# Patient Record
Sex: MALE | Race: WHITE | NOT HISPANIC OR LATINO | Employment: FULL TIME | URBAN - METROPOLITAN AREA
[De-identification: names, ages, dates, MRNs, and addresses within clinical notes are randomized per-mention and may not be internally consistent; named-entity substitution may affect disease eponyms.]

---
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
## 2021-03-18 ENCOUNTER — APPOINTMENT (OUTPATIENT)
Dept: GENERAL RADIOLOGY | Facility: CLINIC | Age: 21
End: 2021-03-18
Attending: FAMILY MEDICINE
Payer: COMMERCIAL

## 2021-03-18 ENCOUNTER — HOSPITAL ENCOUNTER (EMERGENCY)
Facility: CLINIC | Age: 21
Discharge: HOME | End: 2021-03-18
Attending: FAMILY MEDICINE | Admitting: FAMILY MEDICINE
Payer: COMMERCIAL

## 2021-03-18 VITALS
SYSTOLIC BLOOD PRESSURE: 119 MMHG | HEIGHT: 74 IN | BODY MASS INDEX: 20.53 KG/M2 | RESPIRATION RATE: 16 BRPM | WEIGHT: 160 LBS | OXYGEN SATURATION: 97 % | HEART RATE: 58 BPM | TEMPERATURE: 98.3 F | DIASTOLIC BLOOD PRESSURE: 60 MMHG

## 2021-03-18 DIAGNOSIS — R10.13 EPIGASTRIC PAIN: ICD-10-CM

## 2021-03-18 PROCEDURE — 99284 EMERGENCY DEPT VISIT MOD MDM: CPT | Mod: 25 | Performed by: FAMILY MEDICINE

## 2021-03-18 PROCEDURE — 71046 X-RAY EXAM CHEST 2 VIEWS: CPT

## 2021-03-18 PROCEDURE — 93010 ELECTROCARDIOGRAM REPORT: CPT | Performed by: FAMILY MEDICINE

## 2021-03-18 PROCEDURE — 93005 ELECTROCARDIOGRAM TRACING: CPT | Performed by: FAMILY MEDICINE

## 2021-03-18 RX ORDER — MAGNESIUM CARB/ALUMINUM HYDROX 105-160MG
296 TABLET,CHEWABLE ORAL ONCE
Qty: 296 ML | Refills: 0 | Status: SHIPPED | OUTPATIENT
Start: 2021-03-18 | End: 2021-03-18

## 2021-03-18 RX ORDER — POLYETHYLENE GLYCOL 3350 17 G/17G
1 POWDER, FOR SOLUTION ORAL DAILY
Qty: 510 G | Refills: 1 | Status: SHIPPED | OUTPATIENT
Start: 2021-03-18 | End: 2022-03-16

## 2021-03-18 ASSESSMENT — ENCOUNTER SYMPTOMS
DIAPHORESIS: 0
SHORTNESS OF BREATH: 0
DIARRHEA: 0
CONSTIPATION: 0
FREQUENCY: 0
SINUS PRESSURE: 0
HEADACHES: 0
DYSURIA: 0
ABDOMINAL PAIN: 1
COUGH: 0
PALPITATIONS: 1
WHEEZING: 0
SORE THROAT: 0
CHILLS: 0
FEVER: 0
VOMITING: 0
BLOOD IN STOOL: 0
NAUSEA: 0

## 2021-03-18 ASSESSMENT — MIFFLIN-ST. JEOR: SCORE: 1810.51

## 2021-03-18 NOTE — ED PROVIDER NOTES
History     Chief Complaint   Patient presents with     Chest Pain     Patient having on and off chest pain.  Feels like there is a couple extra beats.  Does have stress at home due to father being an alcoholic     HPI  Brenda Rizzo is a 20 year old male who presents with SVT history, palpitations anxiety who was seen for chest pain in 2018 in this facility.  Negative work-up.  Over the last couple of weeks he has felt palpitations at times pain in the epigastric and left upper quadrant region.  At times he feels that his face is flushed with this.  He has had acid reflux-like symptoms in the past.  He also notes recent stressors with his family and his father has alcoholism and he and his girlfriend as well as his sibling and their significant other are all living in the same household.  This is quite stressful.    He denies VTE risk.  Denies recent prolonged travel (>3 hours) by car or plane, history or FHx of venous thromboembolism, recent surgery (last 4 weeks), active cancer history, hypercoagulable state, estrogen or other medications/conditions causing VTE or  new unilateral swelling or pain in the legs or calves.   The pain that he experiences is often in the epigastrium left upper quadrant that can radiate into the chest.  No significant black or tarry stools although he notes a couple weeks ago he saw a stool that was more dark.  No blood in the stool or black tarry stools.  He does use marijuana on a regular basis.  No tobacco.  No alcohol.  Prior use of methamphetamine in the more distant past but nothing recent in the last year plus.  No cocaine use.      Allergies:  No Known Allergies    Problem List:    Patient Active Problem List    Diagnosis Date Noted     Anxiety and depression 11/26/2018     Priority: Medium     Supraventricular tachycardia (H) 06/04/2018     Priority: Medium     Palpitations 06/04/2018     Priority: Medium     Anxiety 06/04/2018     Priority: Medium        Past Medical  "History:    Past Medical History:   Diagnosis Date     Unspecified part of open fracture of clavicle 10/08/2004       Past Surgical History:    Past Surgical History:   Procedure Laterality Date     SURGICAL HISTORY OF -   2002    penoscrotal web repair       Family History:    Family History   Problem Relation Age of Onset     Hypertension Maternal Grandmother      Depression Maternal Grandmother        Social History:  Marital Status:  Single [1]  Social History     Tobacco Use     Smoking status: Former Smoker     Years: 1.00     Types: Vaping Device     Smokeless tobacco: Never Used   Substance Use Topics     Alcohol use: No     Drug use: Yes     Types: Marijuana     Comment: daily        Medications:    magnesium citrate 1.745 GM/30ML solution  polyethylene glycol (MIRALAX) 17 GM/Dose powder  FLUoxetine (PROZAC) 20 MG capsule          Review of Systems   Constitutional: Negative for chills, diaphoresis and fever.   HENT: Negative for ear pain, sinus pressure and sore throat.    Eyes: Negative for visual disturbance.   Respiratory: Negative for cough, shortness of breath and wheezing.    Cardiovascular: Positive for chest pain and palpitations.   Gastrointestinal: Positive for abdominal pain. Negative for blood in stool, constipation, diarrhea, nausea and vomiting.   Genitourinary: Negative for dysuria, frequency and urgency.   Skin: Negative for rash.   Neurological: Negative for headaches.   All other systems reviewed and are negative.      Physical Exam   BP: (!) 135/92  Pulse: 72  Temp: 98.3  F (36.8  C)  Resp: 16  Height: 188 cm (6' 2\")  Weight: 72.6 kg (160 lb)  SpO2: 99 %      Physical Exam  Constitutional:       General: He is in acute distress.      Appearance: He is not diaphoretic.   HENT:      Head: Atraumatic.   Eyes:      Conjunctiva/sclera: Conjunctivae normal.   Neck:      Musculoskeletal: Neck supple.      Thyroid: No thyromegaly.   Cardiovascular:      Rate and Rhythm: Normal rate and regular " rhythm.      Pulses: Normal pulses.      Heart sounds: No murmur.   Pulmonary:      Effort: No respiratory distress.      Breath sounds: No stridor. No wheezing or rhonchi.   Abdominal:      General: Abdomen is flat. Bowel sounds are normal. There is no distension.      Palpations: Abdomen is soft. There is no mass.      Tenderness: There is no abdominal tenderness. There is no guarding.   Musculoskeletal:      Right lower leg: No edema.      Left lower leg: No edema.   Skin:     Coloration: Skin is not pale.      Findings: No rash.   Neurological:      General: No focal deficit present.      Mental Status: He is alert and oriented to person, place, and time.      Cranial Nerves: No cranial nerve deficit.      Sensory: No sensory deficit.      Motor: No weakness.         ED Course        Procedures                  EKG Interpretation:      Interpreted by Garrett Swanson MD  EKG done at 1708 hrs. demonstrates a sinus rhythm 64 bpm normal axis.  No ST change.  T wave flattening throughout  Normal R progression and no Q waves.  Normal intervals.  Normal conduction with the exception of an RSR prime lead V1.  No ectopy.  Impression sinus rhythm 64 bpm T wave flattening throughout the precordium and inferiorly but no significant change from prior EKG no new change in his EKG  compared to the 2018    Critical Care time:  none               Results for orders placed or performed during the hospital encounter of 03/18/21 (from the past 24 hour(s))   Chest XR,  PA & LAT    Narrative    XR CHEST TWO VIEWS   3/18/2021 6:07 PM     HISTORY: Chest pain    COMPARISON: 4/26/2018.      Impression    IMPRESSION: No acute cardiopulmonary disease.       Medications - No data to display    Assessments & Plan (with Medical Decision Making)     MDM: Brenda Rizzo is a 20 year old male who presents with epigastric pain left upper quadrant abdominal pain that radiates into the chest.  This is associated with occasional palpitations and he  also has a history of gastroesophageal reflux, anxiety, multiple stressors at home.  No obvious exertional components or red flags.  No VTE risk factors.  Pain appears to be primarily in the abdomen.  His examination is benign.  Chest x-ray and EKG are normal.  We discussed management as below related to epigastric and left upper quadrant pain with precautions for return and following up with his primary provider after performing measures below with plan follow-up in about 1 to 2 weeks.  Back to the emergency department for significant worsening and  precautions as below  I have reviewed the nursing notes.    I have reviewed the findings, diagnosis, plan and need for follow up with the patient.       New Prescriptions    MAGNESIUM CITRATE 1.745 GM/30ML SOLUTION    Take 296 mLs by mouth once for 1 dose    POLYETHYLENE GLYCOL (MIRALAX) 17 GM/DOSE POWDER    Take 17 g (1 capful) by mouth daily       Final diagnoses:   Epigastric pain - Unclear cause.  possible causes include peptic ulcer/gastritis/GERD. start priliosec 20 mg orally daily for 2-3 weeks as trial. avoid caffeine, smoking, ibuprofen.  may use either maalox or zantac/pepcid as needed.  consider also  constipation as gas/stool under diaphragm in the LUQ can cause pain.  considder bowel  regimen.  mag citrate 300 ml one bottl and stay close to bathroom for 6 hours and if some reduction in pain, then use miralax 1 capoful daily for 1 week.stay hydrated with 64 oz fluid per day and consider fiber supplement.   follow-up clinic for persistent symptoms.  manage stressors.       3/18/2021   [facility] EMERGENCY DEPT     Garrett Swanson MD  03/18/21 [id number]

## 2021-03-18 NOTE — DISCHARGE INSTRUCTIONS
ICD-10-CM    1. Epigastric pain  R10.13     Unclear cause.  possible causes include peptic ulcer/gastritis/GERD. start priliosec 20 mg orally daily for 2-3 weeks as trial. avoid caffeine, smoking, ibuprofen.  may use either maalox or zantac/pepcid as needed.  consider also  constipation as gas/stool under diaphragm in the LUQ can cause pain.  considder bowel  regimen.  mag citrate 300 ml one bottl and stay close to bathroom for 6 hours and if some reduction in pain, then use miralax 1 capoful daily for 1 week.stay hydrated with 64 oz fluid per day and consider fiber supplement.   follow-up clinic for persistent symptoms.  manage stressors.

## 2021-04-04 ENCOUNTER — HEALTH MAINTENANCE LETTER (OUTPATIENT)
Age: 21
End: 2021-04-04

## 2021-09-15 ENCOUNTER — MYC REFILL (OUTPATIENT)
Dept: FAMILY MEDICINE | Facility: CLINIC | Age: 21
End: 2021-09-15

## 2021-09-15 ENCOUNTER — APPOINTMENT (OUTPATIENT)
Dept: ULTRASOUND IMAGING | Facility: CLINIC | Age: 21
End: 2021-09-15
Attending: FAMILY MEDICINE
Payer: COMMERCIAL

## 2021-09-15 ENCOUNTER — HOSPITAL ENCOUNTER (EMERGENCY)
Facility: CLINIC | Age: 21
Discharge: HOME | End: 2021-09-15
Attending: FAMILY MEDICINE | Admitting: FAMILY MEDICINE
Payer: COMMERCIAL

## 2021-09-15 VITALS
BODY MASS INDEX: 19.9 KG/M2 | WEIGHT: 155 LBS | TEMPERATURE: 97.2 F | HEART RATE: 59 BPM | OXYGEN SATURATION: 97 % | SYSTOLIC BLOOD PRESSURE: 129 MMHG | DIASTOLIC BLOOD PRESSURE: 59 MMHG | RESPIRATION RATE: 16 BRPM

## 2021-09-15 DIAGNOSIS — F41.9 ANXIETY AND DEPRESSION: ICD-10-CM

## 2021-09-15 DIAGNOSIS — J02.9 ACUTE PHARYNGITIS, UNSPECIFIED ETIOLOGY: ICD-10-CM

## 2021-09-15 DIAGNOSIS — F32.A ANXIETY AND DEPRESSION: ICD-10-CM

## 2021-09-15 DIAGNOSIS — R10.13 EPIGASTRIC PAIN: ICD-10-CM

## 2021-09-15 DIAGNOSIS — R05.9 COUGH: ICD-10-CM

## 2021-09-15 LAB
ALBUMIN SERPL-MCNC: 4.4 G/DL (ref 3.4–5)
ALP SERPL-CCNC: 88 U/L (ref 40–150)
ALT SERPL W P-5'-P-CCNC: 23 U/L (ref 0–70)
ANION GAP SERPL CALCULATED.3IONS-SCNC: 4 MMOL/L (ref 3–14)
AST SERPL W P-5'-P-CCNC: 13 U/L (ref 0–45)
BASOPHILS # BLD AUTO: 0 10E3/UL (ref 0–0.2)
BASOPHILS NFR BLD AUTO: 1 %
BILIRUB SERPL-MCNC: 0.4 MG/DL (ref 0.2–1.3)
BUN SERPL-MCNC: 7 MG/DL (ref 7–30)
CALCIUM SERPL-MCNC: 9 MG/DL (ref 8.5–10.1)
CHLORIDE BLD-SCNC: 110 MMOL/L (ref 94–109)
CO2 SERPL-SCNC: 27 MMOL/L (ref 20–32)
CREAT SERPL-MCNC: 0.89 MG/DL (ref 0.66–1.25)
DEPRECATED S PYO AG THROAT QL EIA: NEGATIVE
EOSINOPHIL # BLD AUTO: 0.1 10E3/UL (ref 0–0.7)
EOSINOPHIL NFR BLD AUTO: 1 %
ERYTHROCYTE [DISTWIDTH] IN BLOOD BY AUTOMATED COUNT: 12.1 % (ref 10–15)
GFR SERPL CREATININE-BSD FRML MDRD: >90 ML/MIN/1.73M2
GLUCOSE BLD-MCNC: 90 MG/DL (ref 70–99)
HCT VFR BLD AUTO: 44.7 % (ref 40–53)
HGB BLD-MCNC: 15.2 G/DL (ref 13.3–17.7)
IMM GRANULOCYTES # BLD: 0 10E3/UL
IMM GRANULOCYTES NFR BLD: 0 %
LIPASE SERPL-CCNC: 59 U/L (ref 73–393)
LYMPHOCYTES # BLD AUTO: 2.7 10E3/UL (ref 0.8–5.3)
LYMPHOCYTES NFR BLD AUTO: 47 %
MCH RBC QN AUTO: 29.2 PG (ref 26.5–33)
MCHC RBC AUTO-ENTMCNC: 34 G/DL (ref 31.5–36.5)
MCV RBC AUTO: 86 FL (ref 78–100)
MONOCYTES # BLD AUTO: 0.3 10E3/UL (ref 0–1.3)
MONOCYTES NFR BLD AUTO: 6 %
NEUTROPHILS # BLD AUTO: 2.6 10E3/UL (ref 1.6–8.3)
NEUTROPHILS NFR BLD AUTO: 45 %
NRBC # BLD AUTO: 0 10E3/UL
NRBC BLD AUTO-RTO: 0 /100
PLATELET # BLD AUTO: 248 10E3/UL (ref 150–450)
POTASSIUM BLD-SCNC: 4 MMOL/L (ref 3.4–5.3)
PROT SERPL-MCNC: 7.6 G/DL (ref 6.8–8.8)
RBC # BLD AUTO: 5.21 10E6/UL (ref 4.4–5.9)
SARS-COV-2 RNA RESP QL NAA+PROBE: NEGATIVE
SODIUM SERPL-SCNC: 141 MMOL/L (ref 133–144)
WBC # BLD AUTO: 5.7 10E3/UL (ref 4–11)

## 2021-09-15 PROCEDURE — 258N000003 HC RX IP 258 OP 636: Performed by: FAMILY MEDICINE

## 2021-09-15 PROCEDURE — 99284 EMERGENCY DEPT VISIT MOD MDM: CPT | Mod: 25 | Performed by: FAMILY MEDICINE

## 2021-09-15 PROCEDURE — 85025 COMPLETE CBC W/AUTO DIFF WBC: CPT | Performed by: FAMILY MEDICINE

## 2021-09-15 PROCEDURE — 76705 ECHO EXAM OF ABDOMEN: CPT

## 2021-09-15 PROCEDURE — 82040 ASSAY OF SERUM ALBUMIN: CPT | Performed by: FAMILY MEDICINE

## 2021-09-15 PROCEDURE — 87651 STREP A DNA AMP PROBE: CPT | Performed by: FAMILY MEDICINE

## 2021-09-15 PROCEDURE — 99284 EMERGENCY DEPT VISIT MOD MDM: CPT | Performed by: FAMILY MEDICINE

## 2021-09-15 PROCEDURE — C9803 HOPD COVID-19 SPEC COLLECT: HCPCS | Performed by: FAMILY MEDICINE

## 2021-09-15 PROCEDURE — U0005 INFEC AGEN DETEC AMPLI PROBE: HCPCS | Performed by: FAMILY MEDICINE

## 2021-09-15 PROCEDURE — 36415 COLL VENOUS BLD VENIPUNCTURE: CPT | Performed by: FAMILY MEDICINE

## 2021-09-15 PROCEDURE — C9113 INJ PANTOPRAZOLE SODIUM, VIA: HCPCS | Performed by: FAMILY MEDICINE

## 2021-09-15 PROCEDURE — 83690 ASSAY OF LIPASE: CPT | Performed by: FAMILY MEDICINE

## 2021-09-15 PROCEDURE — 250N000011 HC RX IP 250 OP 636: Performed by: FAMILY MEDICINE

## 2021-09-15 PROCEDURE — 96374 THER/PROPH/DIAG INJ IV PUSH: CPT | Performed by: FAMILY MEDICINE

## 2021-09-15 PROCEDURE — 96375 TX/PRO/DX INJ NEW DRUG ADDON: CPT | Performed by: FAMILY MEDICINE

## 2021-09-15 RX ORDER — ONDANSETRON 2 MG/ML
4 INJECTION INTRAMUSCULAR; INTRAVENOUS ONCE
Status: COMPLETED | OUTPATIENT
Start: 2021-09-15 | End: 2021-09-15

## 2021-09-15 RX ADMIN — SODIUM CHLORIDE 1000 ML: 9 INJECTION, SOLUTION INTRAVENOUS at 15:27

## 2021-09-15 RX ADMIN — PANTOPRAZOLE SODIUM 40 MG: 40 INJECTION, POWDER, FOR SOLUTION INTRAVENOUS at 15:28

## 2021-09-15 RX ADMIN — ONDANSETRON 4 MG: 2 INJECTION INTRAMUSCULAR; INTRAVENOUS at 14:50

## 2021-09-15 ASSESSMENT — ENCOUNTER SYMPTOMS
DYSURIA: 0
PALPITATIONS: 0
DIARRHEA: 0
COUGH: 0
HEADACHES: 0
CONSTIPATION: 0
BLOOD IN STOOL: 0
ABDOMINAL PAIN: 0
SINUS PRESSURE: 0
DIAPHORESIS: 0
SHORTNESS OF BREATH: 0
NAUSEA: 0
VOMITING: 0
FREQUENCY: 0
WHEEZING: 0
CHILLS: 0
FEVER: 0
SORE THROAT: 1

## 2021-09-15 NOTE — ED PROVIDER NOTES
History     Chief Complaint   Patient presents with     Abdominal Pain     HPI  Brenda Rizzo is a 21 year old male who epigastric pain onset at 1 PM today.  Prior pain in this region in the past.  Worse with standing.  Nauseous with a single episode of emesis.  Last bowel movement was today and was normal.  No blood in the urine stool.  No hematemesis.  No known history of peptic ulcer disease.  He uses no tobacco alcohol.  Uses regularly marijuana daily.  He has no abdominal surgery history.  Denies urinary tract symptoms.  No dysuria urgency frequency hematuria.    History of prior supraventricular tachycardia palpitations anxiety.        Allergies:  No Known Allergies    Problem List:    Patient Active Problem List    Diagnosis Date Noted     Anxiety and depression 11/26/2018     Priority: Medium     Supraventricular tachycardia (H) 06/04/2018     Priority: Medium     Palpitations 06/04/2018     Priority: Medium     Anxiety 06/04/2018     Priority: Medium        Past Medical History:    Past Medical History:   Diagnosis Date     Unspecified part of open fracture of clavicle 10/08/2004       Past Surgical History:    Past Surgical History:   Procedure Laterality Date     SURGICAL HISTORY OF -   2002    penoscrotal web repair       Family History:    Family History   Problem Relation Age of Onset     Hypertension Maternal Grandmother      Depression Maternal Grandmother        Social History:  Marital Status:  Single [1]  Social History     Tobacco Use     Smoking status: Former Smoker     Years: 1.00     Types: Vaping Device     Smokeless tobacco: Never Used   Substance Use Topics     Alcohol use: No     Drug use: Yes     Types: Marijuana     Comment: daily        Medications:    FLUoxetine (PROZAC) 20 MG capsule  polyethylene glycol (MIRALAX) 17 GM/Dose powder          Review of Systems   Constitutional: Negative for chills, diaphoresis and fever.   HENT: Positive for congestion and sore throat. Negative for  ear pain and sinus pressure.    Eyes: Negative for visual disturbance.   Respiratory: Negative for cough, shortness of breath and wheezing.    Cardiovascular: Negative for chest pain and palpitations.   Gastrointestinal: Negative for abdominal pain, blood in stool, constipation, diarrhea, nausea and vomiting.   Genitourinary: Negative for dysuria, frequency and urgency.   Skin: Negative for rash.   Neurological: Negative for headaches.   All other systems reviewed and are negative.      Physical Exam   BP: 129/59  Pulse: 59  Temp: 97.2  F (36.2  C)  Resp: 16  Weight: 70.3 kg (155 lb)  SpO2: 97 %      Physical Exam  Constitutional:       General: He is in acute distress.      Appearance: He is not diaphoretic.   HENT:      Head: Atraumatic.   Eyes:      Conjunctiva/sclera: Conjunctivae normal.   Cardiovascular:      Rate and Rhythm: Normal rate and regular rhythm.      Heart sounds: No murmur heard.     Pulmonary:      Effort: Pulmonary effort is normal. No respiratory distress.      Breath sounds: No stridor. No wheezing or rhonchi.   Abdominal:      General: There is no distension.      Palpations: There is no mass.      Tenderness: There is no abdominal tenderness. There is no guarding.   Musculoskeletal:      Cervical back: Neck supple.      Right lower leg: No edema.      Left lower leg: No edema.   Skin:     Coloration: Skin is not pale.      Findings: No rash.   Neurological:      General: No focal deficit present.      Mental Status: He is alert.      Motor: No weakness.       Patient had a vasovagal episode after being drawn blood.  He was sweaty with this and felt nauseous.  This cleared.      ED Course        Procedures              Critical Care time:  none               Results for orders placed or performed during the hospital encounter of 09/15/21 (from the past 24 hour(s))   CBC with Platelets & Differential    Narrative    The following orders were created for panel order CBC with Platelets &  Differential.  Procedure                               Abnormality         Status                     ---------                               -----------         ------                     CBC with platelets and d...[[id number]]                      Final result                 Please view results for these tests on the individual orders.   Comprehensive metabolic panel   Result Value Ref Range    Sodium 141 133 - 144 mmol/L    Potassium 4.0 3.4 - 5.3 mmol/L    Chloride 110 (H) 94 - 109 mmol/L    Carbon Dioxide (CO2) 27 20 - 32 mmol/L    Anion Gap 4 3 - 14 mmol/L    Urea Nitrogen 7 7 - 30 mg/dL    Creatinine 0.89 0.66 - 1.25 mg/dL    Calcium 9.0 8.5 - 10.1 mg/dL    Glucose 90 70 - 99 mg/dL    Alkaline Phosphatase 88 40 - 150 U/L    AST 13 0 - 45 U/L    ALT 23 0 - 70 U/L    Protein Total 7.6 6.8 - 8.8 g/dL    Albumin 4.4 3.4 - 5.0 g/dL    Bilirubin Total 0.4 0.2 - 1.3 mg/dL    GFR Estimate >90 >60 mL/min/1.73m2   CBC with platelets and differential   Result Value Ref Range    WBC Count 5.7 4.0 - 11.0 10e3/uL    RBC Count 5.21 4.40 - 5.90 10e6/uL    Hemoglobin 15.2 13.3 - 17.7 g/dL    Hematocrit 44.7 40.0 - 53.0 %    MCV 86 78 - 100 fL    MCH 29.2 26.5 - 33.0 pg    MCHC 34.0 31.5 - 36.5 g/dL    RDW 12.1 10.0 - 15.0 %    Platelet Count 248 150 - 450 10e3/uL    % Neutrophils 45 %    % Lymphocytes 47 %    % Monocytes 6 %    % Eosinophils 1 %    % Basophils 1 %    % Immature Granulocytes 0 %    NRBCs per 100 WBC 0 <1 /100    Absolute Neutrophils 2.6 1.6 - 8.3 10e3/uL    Absolute Lymphocytes 2.7 0.8 - 5.3 10e3/uL    Absolute Monocytes 0.3 0.0 - 1.3 10e3/uL    Absolute Eosinophils 0.1 0.0 - 0.7 10e3/uL    Absolute Basophils 0.0 0.0 - 0.2 10e3/uL    Absolute Immature Granulocytes 0.0 <=0.0 10e3/uL    Absolute NRBCs 0.0 10e3/uL   Lipase   Result Value Ref Range    Lipase 59 (L) 73 - 393 U/L   Symptomatic COVID-19 Virus (Coronavirus) by PCR Nasopharyngeal    Specimen: Nasopharyngeal; Swab   Result Value Ref Range    SARS CoV2  PCR Negative Negative    Narrative    Testing was performed using the Xpert Xpress SARS-CoV-2 Assay on the  [employer] Instrument Systems. Additional information about  this Emergency Use Authorization (EUA) assay can be found via the Lab  Guide. This test should be ordered for the detection of SARS-CoV-2 in  individuals who meet SARS-CoV-2 clinical and/or epidemiological  criteria. Test performance is unknown in asymptomatic patients. This  test is for in vitro diagnostic use under the FDA EUA for  laboratories certified under CLIA to perform high complexity testing.  This test has not been FDA cleared or approved. A negative result  does not rule out the presence of PCR inhibitors in the specimen or  target RNA in concentration below the limit of detection for the  assay. The possibility of a false negative should be considered if  the patient's recent exposure or clinical presentation suggests  COVID-19. This test was validated by the [facility]. This laboratory is certified under  the Clinical Laboratory Improvement Amendments of 1988 (CLIA-88) as  qualified to perform high complexity laboratory testing.     Streptococcus A Rapid Scr w Reflx to PCR    Specimen: Throat; Swab   Result Value Ref Range    Group A Strep antigen Negative Negative   Abdomen US, limited ([facility] only)    Narrative    US ABDOMEN LIMITED 9/15/2021 3:55 PM    CLINICAL HISTORY: Question biliary colic - sudden onset severe right  upper quadrant epigastric pain, vomiting - resolved. Occurred with  fatty food intake.    TECHNIQUE: Limited abdominal ultrasound.    COMPARISON: None.    FINDINGS:    GALLBLADDER: The gallbladder is normal. No gallstones, wall  thickening, or pericholecystic fluid. Negative sonographic Orozco's  sign.    BILE DUCTS: There is no biliary dilatation. The common duct measures  mm.    LIVER: The liver is normal  in echogenicity without focal mass.     RIGHT KIDNEY:  Unremarkable.    PANCREAS: The visualized portions of the pancreas are normal.    No ascites.      Impression    IMPRESSION:  1.  Normal limited abdominal ultrasound. No gallstones or bile duct  dilatation.    ROSA BONILLA MD         SYSTEM ID:  [id number]       Medications - No data to display    Assessments & Plan (with Medical Decision Making)     MDM: Brenda Rizzo is a 21 year old male who presents with history of frequent marijuana use.  Previously healthy.  He arrives with onset at 1:00 of epigastric pain after he ate cereal with almond milk.  This could have triggered a biliary colic episode.  His pain is now improved.  There was no hematemesis.  No history of peptic ulcer disease.  He vapes his marijuana.  He also had recent congestion, pharyngitis.  He is not immunized against Covid.    Findings are reassuring.    Discussed findings.  Symptomatic management.  Additional recommendations as below.  Precautions given for return.    I have reviewed the nursing notes.    I have reviewed the findings, diagnosis, plan and need for follow up with the patient.       New Prescriptions    No medications on file       Final diagnoses:   Cough - await covid test. quarantine.  if negative consider continue quarantine for another 1-2 days and retest - if still negative, then cleared   Acute pharyngitis, unspecified etiology - await strep test   Epigastric pain - no serious findings.  possible causes include gastritis and mj related hyperemesis. back off MJ to less than every other day. Use prilsoec 20 mg orally daily for 2 weeks./ avoid caffeine, alcohol, tobacco.  return for blood in stool, fever, stay hydrated with 64 oz fluid per day       9/15/2021   [facility] EMERGENCY DEPT     Garrett Swanson MD  09/16/21 0046

## 2021-09-15 NOTE — ED NOTES
Pt presents to the ED with complaints of Epigastric abdominal pain since about 1 pm. Pt has had pain like this before. Pain is worse when standing straight up. Pt has been nauseated and has vomited once. Last BM today, normal (for him). denies blood in urine or stool or vomit.

## 2021-09-15 NOTE — DISCHARGE INSTRUCTIONS
ICD-10-CM    1. Cough  R05     await covid test. quarantine.  if negative consider continue quarantine for another 1-2 days and retest - if still negative, then cleared   2. Acute pharyngitis, unspecified etiology  J02.9     await strep test   3. Epigastric pain  R10.13     no serious findings.  possible causes include gastritis and mj related hyperemesis. back off MJ to less than every other day. Use prilsoec 20 mg orally daily for 2 weeks./ avoid caffeine, alcohol, tobacco.  return for blood in stool, fever, stay hydrated with 64 oz fluid per day

## 2021-09-15 NOTE — ED TRIAGE NOTES
Sudden onset upper abdominal pain and nausea after eating cereal  Felt really hot with a headache  Pain worsened when he tried to defecate  No diarrhea  Vomited once

## 2021-09-16 LAB — GROUP A STREP BY PCR: NOT DETECTED

## 2021-09-18 ENCOUNTER — HEALTH MAINTENANCE LETTER (OUTPATIENT)
Age: 21
End: 2021-09-18

## 2021-10-05 ENCOUNTER — HOSPITAL ENCOUNTER (EMERGENCY)
Facility: CLINIC | Age: 21
Discharge: HOME | End: 2021-10-05
Attending: PHYSICIAN ASSISTANT | Admitting: PHYSICIAN ASSISTANT
Payer: OTHER MISCELLANEOUS

## 2021-10-05 VITALS
RESPIRATION RATE: 16 BRPM | WEIGHT: 155 LBS | DIASTOLIC BLOOD PRESSURE: 69 MMHG | HEIGHT: 74 IN | OXYGEN SATURATION: 99 % | BODY MASS INDEX: 19.89 KG/M2 | HEART RATE: 74 BPM | SYSTOLIC BLOOD PRESSURE: 125 MMHG | TEMPERATURE: 98.3 F

## 2021-10-05 DIAGNOSIS — S61.412A LACERATION OF LEFT HAND WITHOUT FOREIGN BODY, INITIAL ENCOUNTER: ICD-10-CM

## 2021-10-05 PROCEDURE — 250N000011 HC RX IP 250 OP 636: Performed by: PHYSICIAN ASSISTANT

## 2021-10-05 PROCEDURE — 12001 RPR S/N/AX/GEN/TRNK 2.5CM/<: CPT | Performed by: PHYSICIAN ASSISTANT

## 2021-10-05 PROCEDURE — 99283 EMERGENCY DEPT VISIT LOW MDM: CPT | Mod: 25 | Performed by: PHYSICIAN ASSISTANT

## 2021-10-05 PROCEDURE — 90715 TDAP VACCINE 7 YRS/> IM: CPT | Performed by: PHYSICIAN ASSISTANT

## 2021-10-05 PROCEDURE — 90471 IMMUNIZATION ADMIN: CPT | Performed by: PHYSICIAN ASSISTANT

## 2021-10-05 RX ADMIN — CLOSTRIDIUM TETANI TOXOID ANTIGEN (FORMALDEHYDE INACTIVATED), CORYNEBACTERIUM DIPHTHERIAE TOXOID ANTIGEN (FORMALDEHYDE INACTIVATED), BORDETELLA PERTUSSIS TOXOID ANTIGEN (GLUTARALDEHYDE INACTIVATED), BORDETELLA PERTUSSIS FILAMENTOUS HEMAGGLUTININ ANTIGEN (FORMALDEHYDE INACTIVATED), BORDETELLA PERTUSSIS PERTACTIN ANTIGEN, AND BORDETELLA PERTUSSIS FIMBRIAE 2/3 ANTIGEN 0.5 ML: 5; 2; 2.5; 5; 3; 5 INJECTION, SUSPENSION INTRAMUSCULAR at 16:47

## 2021-10-05 ASSESSMENT — ENCOUNTER SYMPTOMS
RESPIRATORY NEGATIVE: 1
WOUND: 1
MUSCULOSKELETAL NEGATIVE: 1
CARDIOVASCULAR NEGATIVE: 1
CONSTITUTIONAL NEGATIVE: 1

## 2021-10-05 ASSESSMENT — MIFFLIN-ST. JEOR: SCORE: 1782.83

## 2021-10-05 NOTE — ED TRIAGE NOTES
Left hand lac. Happened at 1310 while working at [employer]. States from metal track to one of the garage doors. Bleeding controlled, dressing in place from PTA. Unknown Td status.

## 2021-10-05 NOTE — ED PROVIDER NOTES
History     Chief Complaint   Patient presents with     Laceration     HPI  Brenda Rizzo is a 21 year old male with a past medical history of supraventricular tachycardia, palpitations, anxiety and depression who presents with a laceration on his left hand which occurred this afternoon.  The injury was caused by catching his hand in the metal track of a garage door while the garage door was closing. The patient works for [employer] in the [employer] yard.  Denies having any pain with range of motion, states that his finger was not crushed.  His hand was caught by the garage door while closing.  The patient currently rates the pain as 7/10 and describes the pain as sharp. The pain is localized to the proximal and dorsal aspect of the ring finger and does not radiate. The patient has not been taking any over-the-counter medications for relief of pain.  He has cleaned the wound with copious amounts of fluid and bandaged it prior to coming to clinic.  Has normal sensation and normal strength in the ring finger, left hand. No previous surgeries or injuries to the left hand.  Last Tdap vaccine was in 2013.     Allergies:  No Known Allergies    Problem List:    Patient Active Problem List    Diagnosis Date Noted     Anxiety and depression 11/26/2018     Priority: Medium     Supraventricular tachycardia (H) 06/04/2018     Priority: Medium     Palpitations 06/04/2018     Priority: Medium     Anxiety 06/04/2018     Priority: Medium        Past Medical History:    Past Medical History:   Diagnosis Date     Unspecified part of open fracture of clavicle 10/08/2004       Past Surgical History:    Past Surgical History:   Procedure Laterality Date     SURGICAL HISTORY OF -   2002    penoscrotal web repair       Family History:    Family History   Problem Relation Age of Onset     Hypertension Maternal Grandmother      Depression Maternal Grandmother        Social History:  Marital Status:  Single [1]  Social History     Tobacco Use  "    Smoking status: Former Smoker     Years: 1.00     Types: Vaping Device     Smokeless tobacco: Never Used   Substance Use Topics     Alcohol use: No     Drug use: Yes     Types: Marijuana     Comment: daily        Medications:    FLUoxetine (PROZAC) 20 MG capsule  polyethylene glycol (MIRALAX) 17 GM/Dose powder          Review of Systems   Constitutional: Negative.    HENT: Negative.    Respiratory: Negative.    Cardiovascular: Negative.    Musculoskeletal: Negative.    Skin: Positive for wound.       Physical Exam   BP: 125/69  Pulse: 74  Temp: 98.3  F (36.8  C)  Resp: 16  Height: 188 cm (6' 2\")  Weight: 70.3 kg (155 lb)  SpO2: 99 %      Physical Exam  Constitutional:       General: He is not in acute distress.     Appearance: Normal appearance. He is not ill-appearing, toxic-appearing or diaphoretic.   HENT:      Head: Normocephalic and atraumatic.   Cardiovascular:      Pulses: Normal pulses.           Radial pulses are 2+ on the left side.   Skin:     General: Skin is warm and dry.      Findings: Laceration present. No rash.             Comments: Laceration at the dorsal and proximal aspect of the fourth digit, left hand.  Laceration is 1 cm in diameter, 3 mm in depth.  No bony tenderness.  Normal strength and sensation in the affected digit.   Neurological:      General: No focal deficit present.      Mental Status: He is alert and oriented to person, place, and time.      Sensory: No sensory deficit.      Motor: No weakness.      Coordination: Coordination normal.      Gait: Gait normal.   Psychiatric:         Mood and Affect: Mood normal.         Behavior: Behavior normal.         Thought Content: Thought content normal.         Judgment: Judgment normal.         ED Course        Procedures                No results found for this or any previous visit (from the past 24 hour(s)).    Medications   Tdap (tetanus-diphtheria-acell pertussis) (ADACEL) injection 0.5 mL (0.5 mLs Intramuscular Given 10/5/21 1841) "       Assessments & Plan (with Medical Decision Making)   The patient is a 21 year old male with a past medical history of supraventricular tachycardia, palpitations, anxiety and depression who presents with a laceration on his left hand which occurred this afternoon.  The injury was caused by catching his hand in the metal track of a garage door while the garage door was closing.  No crush injury today    Updated the patient's Tdap vaccine today.    The patient had no bony tenderness on exam, had full range of motion without any pain whatsoever.  No reports of objects splintering or coming apart when the injury occurred.  I have no concerns for foreign body today, no concerns for acute fractures today.  That said, x-rays are not necessary today.    Laceration repair was performed as above.  Apply ice for 15-minute intervals over the next 48 hours and use over-the-counter Tylenol/ibuprofen for pain control as needed.  Recommended keeping the wound covered for the next 24-48 hours.  Then allow exposure to air at nighttime, change dressings and apply bacitracin twice daily for the next 7 days. Recommend keeping the wound covered while while there is potential for contamination.  Recommend returning to clinic in 7 to 10 days for suture removal and wound recheck.    Recommend urgent medical evaluation if the patient develops worsening pain, pain out of proportion to injury, numbness or tingling or loss of feeling, or redness/tenderness/swelling in the ring finger of the left hand or in the left hand.    I have reviewed the nursing notes.    I have reviewed the findings, diagnosis, plan and need for follow up with the patient.      New Prescriptions    No medications on file       Final diagnoses:   Laceration of left hand without foreign body, initial encounter       10/5/2021   [facility] EMERGENCY DEPT     Linden Zurita PA-C  10/05/21 4880

## 2021-10-11 ENCOUNTER — OFFICE VISIT (OUTPATIENT)
Dept: FAMILY MEDICINE | Facility: CLINIC | Age: 21
End: 2021-10-11
Payer: COMMERCIAL

## 2021-10-11 VITALS
SYSTOLIC BLOOD PRESSURE: 120 MMHG | RESPIRATION RATE: 14 BRPM | DIASTOLIC BLOOD PRESSURE: 80 MMHG | WEIGHT: 156 LBS | TEMPERATURE: 97.6 F | BODY MASS INDEX: 20.02 KG/M2 | HEIGHT: 74 IN | OXYGEN SATURATION: 98 % | HEART RATE: 72 BPM

## 2021-10-11 DIAGNOSIS — F32.A ANXIETY AND DEPRESSION: Primary | ICD-10-CM

## 2021-10-11 DIAGNOSIS — F41.9 ANXIETY AND DEPRESSION: Primary | ICD-10-CM

## 2021-10-11 DIAGNOSIS — Z48.02 VISIT FOR SUTURE REMOVAL: ICD-10-CM

## 2021-10-11 DIAGNOSIS — S61.412D LACERATION OF LEFT HAND WITHOUT FOREIGN BODY, SUBSEQUENT ENCOUNTER: ICD-10-CM

## 2021-10-11 PROCEDURE — 99214 OFFICE O/P EST MOD 30 MIN: CPT | Performed by: FAMILY MEDICINE

## 2021-10-11 RX ORDER — ESCITALOPRAM OXALATE 5 MG/1
5 TABLET ORAL DAILY
Qty: 30 TABLET | Refills: 0 | Status: SHIPPED | OUTPATIENT
Start: 2021-10-11 | End: 2021-11-05

## 2021-10-11 ASSESSMENT — ANXIETY QUESTIONNAIRES
5. BEING SO RESTLESS THAT IT IS HARD TO SIT STILL: SEVERAL DAYS
7. FEELING AFRAID AS IF SOMETHING AWFUL MIGHT HAPPEN: NEARLY EVERY DAY
6. BECOMING EASILY ANNOYED OR IRRITABLE: NEARLY EVERY DAY
IF YOU CHECKED OFF ANY PROBLEMS ON THIS QUESTIONNAIRE, HOW DIFFICULT HAVE THESE PROBLEMS MADE IT FOR YOU TO DO YOUR WORK, TAKE CARE OF THINGS AT HOME, OR GET ALONG WITH OTHER PEOPLE: VERY DIFFICULT
3. WORRYING TOO MUCH ABOUT DIFFERENT THINGS: MORE THAN HALF THE DAYS
GAD7 TOTAL SCORE: 16
1. FEELING NERVOUS, ANXIOUS, OR ON EDGE: NEARLY EVERY DAY
2. NOT BEING ABLE TO STOP OR CONTROL WORRYING: MORE THAN HALF THE DAYS

## 2021-10-11 ASSESSMENT — PATIENT HEALTH QUESTIONNAIRE - PHQ9
SUM OF ALL RESPONSES TO PHQ QUESTIONS 1-9: 16
5. POOR APPETITE OR OVEREATING: MORE THAN HALF THE DAYS

## 2021-10-11 ASSESSMENT — MIFFLIN-ST. JEOR: SCORE: 1787.36

## 2021-10-11 NOTE — PROGRESS NOTES
Assessment & Plan     Anxiety and depression  Recurrent.  Patient was advised of his treatment options.  Since with good response to escitalopram from family members, will start this med. Patient concurred.  Advised expected course of treatment and response.  Consider CBT if not better after next 2 months,.  Encouraged active livestyle.  Return precautions discussed and given to patient.   Suicidal precautions discussed in detail.  - escitalopram (LEXAPRO) 5 MG tablet  Dispense: 30 tablet; Refill: 0    Visit for suture removal  Patient was advised wound is healing well.  Discussed removal of sutures procedure, and expected outcomes.  Daily  laceration site care discussed in detail.  Return precautions discussed and given to patient.    Laceration of left hand without foreign body, subsequent encounter      Depression Screening Follow Up    PHQ 10/11/2021   PHQ-9 Total Score 16   Q9: Thoughts of better off dead/self-harm past 2 weeks Several days     Last PHQ-9 10/11/2021   1.  Little interest or pleasure in doing things 3   2.  Feeling down, depressed, or hopeless 1   3.  Trouble falling or staying asleep, or sleeping too much 1   4.  Feeling tired or having little energy 2   5.  Poor appetite or overeating 3   6.  Feeling bad about yourself 2   7.  Trouble concentrating 2   8.  Moving slowly or restless 1   Q9: Thoughts of better off dead/self-harm past 2 weeks 1   PHQ-9 Total Score 16   Difficulty at work, home, or with people Very difficult         No flowsheet data found.      Follow Up      Follow Up Actions Taken  Crisis resource information provided in the After Visit Summary  Scheduled appointment with [facility]    Discussed the following ways the patient can remain in a safe environment:  be around others and remove things he could use to hurt self.  Patient Instructions   Start escitalopram 5 mg daily.    Take your medication as directed.  Full effect/benefit of the medications may not be evident until a few  weeks after start.      Will consider counseling or behavior therapy if no improvement after 2 months.  Take adequate sleep, and exercise regularly.  If you experience suicidal thoughts, thoughts of hurting others or hallucinations, see a doctor right away.      After suture removal:  The steristrips will remain in place for a few days. If they fall off, no need to replace.  You may cleanse laceration area with warm soapy water and dry it off thoroughly.  See doctor again if with swelling, bleeding or discharge from the healing wound.  Patient Education     Anxiety Reaction  Anxiety is the feeling we all get when we think something bad might happen. It is a normal response to stress and normally causes only a mild reaction. When anxiety becomes more severe, it can interfere with daily life. In some cases, you may not even be aware of what you re anxious about. There may also be a genetic link. Or it may be a learned behavior in the home.   Both psychological and physical triggers cause stress reaction. It's often a response to fear or emotional stress, real or imagined. This stress may come from home, family, work, or social relationships.   During an anxiety reaction, you may feel:    Helpless    Nervous    Depressed    Grouchy  Your body may show signs of anxiety in many ways. You may experience:    Dry mouth    Shakiness    Dizziness    Weakness    Trouble breathing    Breathing fast (hyperventilating)    Chest pressure    Sweating    Headache    Nausea    Diarrhea    Tiredness    Inability to sleep    Sexual problems  Home care    Try to find the sources of stress in your life. They may not be obvious. These may include:  ? Daily hassles of life (such as traffic jams, missed appointments, or car troubles)  ? Major life changes, both good (new baby or job promotion) and bad (loss of job or loss of loved one)  ? Overload (feeling that you have too many responsibilities and can't take care of all of them at  once)  ? Feeling helpless or feeling that your problems can't be solved    Notice how your body reacts to stress. Learn to listen to your body signals. This will help you take action before the stress becomes severe.    When you can, do something about the source of your stress. (Avoid hassles, limit the amount of change that happens in your life at one time, and take a break when you feel overloaded).    Unfortunately, many stressful situations can't be avoided. It is necessary to learn how to better manage stress. There are many proven methods that will reduce your anxiety. These include simple things such as exercise, good nutrition, and adequate rest. Also, there are certain techniques that are helpful:  ? Relaxation  ? Breathing exercises  ? Visualization  ? Biofeedback  ? Meditation  For more information about this, talk with your healthcare provider. Or check online or at your local library or bookstore. You'll find many books and audiobooks on this subject.   Follow-up care  If you feel your anxiety is not responding to self-help measures, call your healthcare provider or make an appointment with a counselor. You may need short-term psychological counseling or medicine to help you manage stress.   Call 911  Call 911 if any of these happen:     Trouble breathing    Confusion    Drowsiness or trouble waking up    Fainting or loss of consciousness    Rapid heart rate    Seizure    New chest pain that becomes more severe, lasts longer, or spreads into your shoulder, arm, neck, jaw, or back  When to get medical advice  Call your healthcare provider right away if any of these happen:    Your symptoms get worse    Severe headache not eased by rest and mild pain reliever  [employer] last reviewed this educational content on 4/1/2020 2000-2021 [employer]. All rights reserved. This information is not intended as a substitute for professional medical care. Always follow your healthcare professional's  "instructions.           Patient Education     Stitches or Staple Removal  You were seen today for removal of your stitches or staples. Your wound is healing as expected. The wound has healed well enough that the stitches or staples can be removed. The wound will continue to heal for the next few months.   At this time there is no sign of infection.   Home care    If you have pain, take pain medicine as advised by your healthcare provider.     Keep your wound clean and protected by covering it with a bandage for the next week or so.     Wash your hands with soap and warm water before and after caring for your wound. This helps prevent infection.    Clean the wound gently with soap and clean, running water daily or as directed by your healthcare provider. Don't use iodine, alcohol, or other cleansers on the wound.  Gently pat it dry. Put on a new bandage, if needed. Don't reuse bandages.    If the area gets wet, gently pat it dry with a clean cloth. Replace the wet bandage with a dry one.    Check the wound daily for signs of infection. (These are listed under \"When to seek medical advice\" below.)    You may shower and bathe as usual. Swimming may be allowed, but check with your healthcare provider first.    Keep the wound out of prolonged direct sunlight. The new skin is very sensitive and can easily sunburn causing worse scarring.    Ask your provider about using over-the-counter scar removing creams if your wound is highly visible.    Follow-up care  Follow up with your healthcare provider as advised.  When to seek medical advice   Call your healthcare provider if any of the following occur:    Wound reopens or bleeds    Signs of an infection, such as:  ? Increasing redness or swelling around the wound  ? Increased warmth from the wound  ? Worsening pain  ? Red streaking lines away from the wound  ? Fluid draining from the wound    Fever of 100.4 F (38 C) or higher, or as directed by your healthcare " provider  [employer] last reviewed this educational content on 4/1/2018 2000-2021 [employer]. All rights reserved. This information is not intended as a substitute for professional medical care. Always follow your healthcare professional's instructions.           Patient Education     Depression: Tips to Help Yourself    As your healthcare providers help treat your depression, you can also help yourself. Keep in mind that your illness affects you emotionally, physically, mentally, and socially. So full recovery will take time. Take care of your body and your soul, and be patient with yourself as you get better.  Self-care    Educate yourself. Read about treatment and medicine options. If you have the energy, attend local conferences or support groups. Keep a list of useful websites and helpful books and use them as needed. This illness is not your fault. Don t blame yourself for your depression.    Manage early symptoms. If you notice symptoms returning, experience triggers, or identify other factors that may lead to a depressive episode, get help as soon as possible. Ask trusted friends and family to monitor your behavior and let you know if they see anything of concern.    Work with your provider. Find a provider you can trust. Communicate honestly with that person and share information on your treatment for depression and your reaction to medicines.    Be prepared for a crisis. Know what to do if you experience a crisis. Keep the phone number of a crisis hotline and know the location of your community's urgent care centers and the closest emergency department.    Hold off on big decisions. Depression can cloud your judgment. So wait until you feel better before making major life decisions, such as changing jobs, moving, or getting  or .    Be patient. Recovering from depression is a process. Don t be discouraged if it takes some time to feel better.    Keep it simple. Depression  saps your energy and concentration. So you won t be able to do all the things you used to do. Set small goals and do what you can.    Be with others. Don t isolate yourself--you ll only feel worse. Try to be with other people. And take part in fun activities when you can. Go to a movie, ballgame, Jain service, or social event. Talk openly with people you can trust. And accept help when it s offered.    Take care of your body  People with depression often lose the desire to take care of themselves. That only makes their problems worse. During treatment and afterward, make a point to:    Exercise. It s a great way to take care of your body. And studies have shown that exercise helps fight depression. Aim for 30 minutes of moderate activity a day. Walking in small blocks of time (5-10 minutes) is a good way to start, but anything that gets you moving (gardening, house cleaning) counts.    Don't use drugs and alcohol. These may ease the pain in the short term. But they ll only make your problems worse in the long run.    Get relief from stress. Ask your healthcare provider for relaxation exercises and techniques to help relieve stress. Consider activities like meditation, yoga, or Jonathan Chi.    Eat right. A balanced and healthy diet helps keep your body healthy.    Get adequate sleep. Aim for 8 hours per night. Too much or too little sleep can cause other physical and emotional problems.  [employer] last reviewed this educational content on 12/1/2019 2000-2021 [employer]. All rights reserved. This information is not intended as a substitute for professional medical care. Always follow your healthcare professional's instructions.               Return in about 1 month (around 11/11/2021) for Virtual visit for anxiety and depression follow up.    Dallas Leroy MD  [facility]    Pepito Gutierrez is a 21 year old who presents for the following health issues  accompanied by his  "significant other:  Chief Complaint   Patient presents with     Depression     Pt here for a f/u on depression and anxiety.  He stopped taking his med a couple years ago and feels like he would like to try something different.     ER F/U     Pt also here for post e/r f/u possible suture removal.       HPI     ED/UC Followup:    Facility:  [facility]  Date of visit: 10/5/21  Reason for visit: laceration of left hand  Current Status: healing good       Depression and Anxiety Follow-Up    How are you doing with your depression since your last visit? Worsened some    How are you doing with your anxiety since your last visit?  Worsened , pt feels this is more anxiety related than depression    Are you having other symptoms that might be associated with depression or anxiety? Yes:  trouble concentrating, non motivated, procrastinates, very anxious when talking to people, does not like being center of attention    Have you had a significant life event? Job Concerns, Grief or Loss and OTHER: new job, moved into apartment, baby on the way, grandma passed away     Do you have any concerns with your use of alcohol or other drugs? No     Father is on escitalopram - works well for him.  Patient is asking if this is appropriate for him.    Sx per patient: excessive worrying of little things, work is difficult due to anxiety when talking to people, afraid to mess up, \"told I was hypochondriac\".    Max with; focusing on scheduling, stay busy at work so he does not think of his feelings, play video games.     Social History     Tobacco Use     Smoking status: Former Smoker     Years: 1.00     Types: Vaping Device     Smokeless tobacco: Never Used   Vaping Use     Vaping Use: Former   Substance Use Topics     Alcohol use: No     Drug use: Not Currently     Types: Marijuana     Comment: daily     PHQ 10/22/2018 11/19/2018 10/11/2021   PHQ-9 Total Score 17 11 16   Q9: Thoughts of better off dead/self-harm past 2 weeks More " than half the days Not at all Several days     PETROS-7 SCORE 10/22/2018 11/19/2018 10/11/2021   Total Score 9 15 16     Last PHQ-9 10/11/2021   1.  Little interest or pleasure in doing things 3   2.  Feeling down, depressed, or hopeless 1   3.  Trouble falling or staying asleep, or sleeping too much 1   4.  Feeling tired or having little energy 2   5.  Poor appetite or overeating 3   6.  Feeling bad about yourself 2   7.  Trouble concentrating 2   8.  Moving slowly or restless 1   Q9: Thoughts of better off dead/self-harm past 2 weeks 1   PHQ-9 Total Score 16   Difficulty at work, home, or with people Very difficult     PETROS-7  10/11/2021   1. Feeling nervous, anxious, or on edge 3   2. Not being able to stop or control worrying 2   3. Worrying too much about different things 2   4. Trouble relaxing 2   5. Being so restless that it is hard to sit still 1   6. Becoming easily annoyed or irritable 3   7. Feeling afraid, as if something awful might happen 3   PETROS-7 Total Score 16   If you checked any problems, how difficult have they made it for you to do your work, take care of things at home, or get along with other people? Very difficult       Suicide Assessment Five-step Evaluation and Treatment (SAFE-T)      How many servings of fruits and vegetables do you eat daily?  2-3    On average, how many sweetened beverages do you drink each day (Examples: soda, juice, sweet tea, etc.  Do NOT count diet or artificially sweetened beverages)?   1-2 pops    How many days per week do you exercise enough to make your heart beat faster? 4    How many minutes a day do you exercise enough to make your heart beat faster? Active at job    How many days per week do you miss taking your medication? Not currently on medication      Review of Systems   C: NEGATIVE for fever, chills, change in weight  I: NEGATIVE for worrisome rashes, moles or lesions  E: NEGATIVE for vision changes or irritation  E/M: NEGATIVE for ear, mouth and throat  "problems  R: NEGATIVE for significant cough or SOB  CV: NEGATIVE for chest pain, palpitations or peripheral edema  GI: NEGATIVE for nausea, abdominal pain, heartburn, or change in bowel habits  : NEGATIVE for frequency, dysuria, or hematuria  N: NEGATIVE for weakness, dizziness or paresthesias  E: NEGATIVE for temperature intolerance, skin/hair changes  PSYCHIATRIC: see above      Objective    /80   Pulse 72   Temp 97.6  F (36.4  C) (Tympanic)   Resp 14   Ht 1.88 m (6' 2\")   Wt 70.8 kg (156 lb)   SpO2 98%   BMI 20.03 kg/m    Body mass index is 20.03 kg/m .  Physical Exam   GENERAL: healthy, alert and no distress  EYES: no icterus, PERRLA  SKIN: linear laceration on extensore aspect of left 3rd MCP with good healing and intact sutures (x3)  NEURO: no tremors  PSYCH: well-kempt, linear thought process, slightly depressed mood, appropriate affect, no suicidality/aggression/hallucination  No results found for any visits on 10/11/21.        "

## 2021-10-11 NOTE — PATIENT INSTRUCTIONS
Start escitalopram 5 mg daily.    Take your medication as directed.  Full effect/benefit of the medications may not be evident until a few weeks after start.      Will consider counseling or behavior therapy if no improvement after 2 months.  Take adequate sleep, and exercise regularly.  If you experience suicidal thoughts, thoughts of hurting others or hallucinations, see a doctor right away.      After suture removal:  The steristrips will remain in place for a few days. If they fall off, no need to replace.  You may cleanse laceration area with warm soapy water and dry it off thoroughly.  See doctor again if with swelling, bleeding or discharge from the healing wound.  Patient Education     Anxiety Reaction  Anxiety is the feeling we all get when we think something bad might happen. It is a normal response to stress and normally causes only a mild reaction. When anxiety becomes more severe, it can interfere with daily life. In some cases, you may not even be aware of what you re anxious about. There may also be a genetic link. Or it may be a learned behavior in the home.   Both psychological and physical triggers cause stress reaction. It's often a response to fear or emotional stress, real or imagined. This stress may come from home, family, work, or social relationships.   During an anxiety reaction, you may feel:    Helpless    Nervous    Depressed    Grouchy  Your body may show signs of anxiety in many ways. You may experience:    Dry mouth    Shakiness    Dizziness    Weakness    Trouble breathing    Breathing fast (hyperventilating)    Chest pressure    Sweating    Headache    Nausea    Diarrhea    Tiredness    Inability to sleep    Sexual problems  Home care    Try to find the sources of stress in your life. They may not be obvious. These may include:  ? Daily hassles of life (such as traffic jams, missed appointments, or car troubles)  ? Major life changes, both good (new baby or job promotion) and bad  (loss of job or loss of loved one)  ? Overload (feeling that you have too many responsibilities and can't take care of all of them at once)  ? Feeling helpless or feeling that your problems can't be solved    Notice how your body reacts to stress. Learn to listen to your body signals. This will help you take action before the stress becomes severe.    When you can, do something about the source of your stress. (Avoid hassles, limit the amount of change that happens in your life at one time, and take a break when you feel overloaded).    Unfortunately, many stressful situations can't be avoided. It is necessary to learn how to better manage stress. There are many proven methods that will reduce your anxiety. These include simple things such as exercise, good nutrition, and adequate rest. Also, there are certain techniques that are helpful:  ? Relaxation  ? Breathing exercises  ? Visualization  ? Biofeedback  ? Meditation  For more information about this, talk with your healthcare provider. Or check online or at your local library or bookstore. You'll find many books and audiobooks on this subject.   Follow-up care  If you feel your anxiety is not responding to self-help measures, call your healthcare provider or make an appointment with a counselor. You may need short-term psychological counseling or medicine to help you manage stress.   Call 911  Call 911 if any of these happen:     Trouble breathing    Confusion    Drowsiness or trouble waking up    Fainting or loss of consciousness    Rapid heart rate    Seizure    New chest pain that becomes more severe, lasts longer, or spreads into your shoulder, arm, neck, jaw, or back  When to get medical advice  Call your healthcare provider right away if any of these happen:    Your symptoms get worse    Severe headache not eased by rest and mild pain reliever  [employer] last reviewed this educational content on 4/1/2020 2000-2021 [employer]. All rights  "reserved. This information is not intended as a substitute for professional medical care. Always follow your healthcare professional's instructions.           Patient Education     Stitches or Staple Removal  You were seen today for removal of your stitches or staples. Your wound is healing as expected. The wound has healed well enough that the stitches or staples can be removed. The wound will continue to heal for the next few months.   At this time there is no sign of infection.   Home care    If you have pain, take pain medicine as advised by your healthcare provider.     Keep your wound clean and protected by covering it with a bandage for the next week or so.     Wash your hands with soap and warm water before and after caring for your wound. This helps prevent infection.    Clean the wound gently with soap and clean, running water daily or as directed by your healthcare provider. Don't use iodine, alcohol, or other cleansers on the wound.  Gently pat it dry. Put on a new bandage, if needed. Don't reuse bandages.    If the area gets wet, gently pat it dry with a clean cloth. Replace the wet bandage with a dry one.    Check the wound daily for signs of infection. (These are listed under \"When to seek medical advice\" below.)    You may shower and bathe as usual. Swimming may be allowed, but check with your healthcare provider first.    Keep the wound out of prolonged direct sunlight. The new skin is very sensitive and can easily sunburn causing worse scarring.    Ask your provider about using over-the-counter scar removing creams if your wound is highly visible.    Follow-up care  Follow up with your healthcare provider as advised.  When to seek medical advice   Call your healthcare provider if any of the following occur:    Wound reopens or bleeds    Signs of an infection, such as:  ? Increasing redness or swelling around the wound  ? Increased warmth from the wound  ? Worsening pain  ? Red streaking lines away " from the wound  ? Fluid draining from the wound    Fever of 100.4 F (38 C) or higher, or as directed by your healthcare provider  [employer] last reviewed this educational content on 4/1/2018 2000-2021 [employer]. All rights reserved. This information is not intended as a substitute for professional medical care. Always follow your healthcare professional's instructions.           Patient Education     Depression: Tips to Help Yourself    As your healthcare providers help treat your depression, you can also help yourself. Keep in mind that your illness affects you emotionally, physically, mentally, and socially. So full recovery will take time. Take care of your body and your soul, and be patient with yourself as you get better.  Self-care    Educate yourself. Read about treatment and medicine options. If you have the energy, attend local conferences or support groups. Keep a list of useful websites and helpful books and use them as needed. This illness is not your fault. Don t blame yourself for your depression.    Manage early symptoms. If you notice symptoms returning, experience triggers, or identify other factors that may lead to a depressive episode, get help as soon as possible. Ask trusted friends and family to monitor your behavior and let you know if they see anything of concern.    Work with your provider. Find a provider you can trust. Communicate honestly with that person and share information on your treatment for depression and your reaction to medicines.    Be prepared for a crisis. Know what to do if you experience a crisis. Keep the phone number of a crisis hotline and know the location of your community's urgent care centers and the closest emergency department.    Hold off on big decisions. Depression can cloud your judgment. So wait until you feel better before making major life decisions, such as changing jobs, moving, or getting  or .    Be patient. Recovering  from depression is a process. Don t be discouraged if it takes some time to feel better.    Keep it simple. Depression saps your energy and concentration. So you won t be able to do all the things you used to do. Set small goals and do what you can.    Be with others. Don t isolate yourself--you ll only feel worse. Try to be with other people. And take part in fun activities when you can. Go to a movie, ballgame, [employer] service, or social event. Talk openly with people you can trust. And accept help when it s offered.    Take care of your body  People with depression often lose the desire to take care of themselves. That only makes their problems worse. During treatment and afterward, make a point to:    Exercise. It s a great way to take care of your body. And studies have shown that exercise helps fight depression. Aim for 30 minutes of moderate activity a day. Walking in small blocks of time (5-10 minutes) is a good way to start, but anything that gets you moving (gardening, house cleaning) counts.    Don't use drugs and alcohol. These may ease the pain in the short term. But they ll only make your problems worse in the long run.    Get relief from stress. Ask your healthcare provider for relaxation exercises and techniques to help relieve stress. Consider activities like meditation, yoga, or Jonathan Chi.    Eat right. A balanced and healthy diet helps keep your body healthy.    Get adequate sleep. Aim for 8 hours per night. Too much or too little sleep can cause other physical and emotional problems.  [employer] last reviewed this educational content on 12/1/2019 2000-2021 [employer]. All rights reserved. This information is not intended as a substitute for professional medical care. Always follow your healthcare professional's instructions.

## 2021-10-11 NOTE — LETTER
October 11, 2021      Brenda Rizzo  [address] [facility] [address]        To Whom It May Concern:    Brenda has been seen in the clinic today, October 11, 2021,  for a medical appointment.      Sincerely,        Dallas Leroy MD

## 2021-10-12 ASSESSMENT — ANXIETY QUESTIONNAIRES: GAD7 TOTAL SCORE: 16

## 2021-11-05 ENCOUNTER — MYC REFILL (OUTPATIENT)
Dept: FAMILY MEDICINE | Facility: CLINIC | Age: 21
End: 2021-11-05
Payer: COMMERCIAL

## 2021-11-05 DIAGNOSIS — F32.A ANXIETY AND DEPRESSION: ICD-10-CM

## 2021-11-05 DIAGNOSIS — F41.9 ANXIETY AND DEPRESSION: ICD-10-CM

## 2021-11-07 ENCOUNTER — MYC MEDICAL ADVICE (OUTPATIENT)
Dept: FAMILY MEDICINE | Facility: CLINIC | Age: 21
End: 2021-11-07
Payer: COMMERCIAL

## 2021-11-08 RX ORDER — ESCITALOPRAM OXALATE 5 MG/1
5 TABLET ORAL DAILY
Qty: 30 TABLET | Refills: 0 | Status: SHIPPED | OUTPATIENT
Start: 2021-11-08 | End: 2021-12-13

## 2021-11-08 NOTE — TELEPHONE ENCOUNTER
Covering for PCP (out of clinic today):  Refill sent.  Appears that plan was to follow-up next month if symptoms were not improved.    Thanks,  Gage Ziegler MD

## 2021-11-08 NOTE — TELEPHONE ENCOUNTER
Routing refill request to provider for review/approval because:  Phq 9    PHQ 10/22/2018 11/19/2018 10/11/2021   PHQ-9 Total Score 17 11 16   Q9: Thoughts of better off dead/self-harm past 2 weeks More than half the days Not at all Several days     Thank you    Kiesha LEVIN RN

## 2021-11-15 ENCOUNTER — VIRTUAL VISIT (OUTPATIENT)
Dept: FAMILY MEDICINE | Facility: CLINIC | Age: 21
End: 2021-11-15
Payer: COMMERCIAL

## 2021-11-15 DIAGNOSIS — Z91.199 FAILURE TO ATTEND APPOINTMENT: Primary | ICD-10-CM

## 2021-12-10 ENCOUNTER — MYC REFILL (OUTPATIENT)
Dept: FAMILY MEDICINE | Facility: CLINIC | Age: 21
End: 2021-12-10
Payer: COMMERCIAL

## 2021-12-10 DIAGNOSIS — F41.9 ANXIETY AND DEPRESSION: ICD-10-CM

## 2021-12-10 DIAGNOSIS — F32.A ANXIETY AND DEPRESSION: ICD-10-CM

## 2021-12-11 ENCOUNTER — MYC MEDICAL ADVICE (OUTPATIENT)
Dept: FAMILY MEDICINE | Facility: CLINIC | Age: 21
End: 2021-12-11
Payer: COMMERCIAL

## 2021-12-11 DIAGNOSIS — F41.9 ANXIETY AND DEPRESSION: ICD-10-CM

## 2021-12-11 DIAGNOSIS — F32.A ANXIETY AND DEPRESSION: ICD-10-CM

## 2021-12-13 ENCOUNTER — APPOINTMENT (OUTPATIENT)
Dept: GENERAL RADIOLOGY | Facility: CLINIC | Age: 21
End: 2021-12-13
Attending: PHYSICIAN ASSISTANT
Payer: COMMERCIAL

## 2021-12-13 ENCOUNTER — HOSPITAL ENCOUNTER (EMERGENCY)
Facility: CLINIC | Age: 21
Discharge: HOME | End: 2021-12-13
Attending: PHYSICIAN ASSISTANT | Admitting: PHYSICIAN ASSISTANT
Payer: COMMERCIAL

## 2021-12-13 VITALS
SYSTOLIC BLOOD PRESSURE: 119 MMHG | HEART RATE: 88 BPM | TEMPERATURE: 99.4 F | BODY MASS INDEX: 19.25 KG/M2 | OXYGEN SATURATION: 98 % | RESPIRATION RATE: 18 BRPM | DIASTOLIC BLOOD PRESSURE: 49 MMHG | WEIGHT: 150 LBS | HEIGHT: 74 IN

## 2021-12-13 DIAGNOSIS — U07.1 INFECTION DUE TO 2019 NOVEL CORONAVIRUS: ICD-10-CM

## 2021-12-13 LAB
FLUAV RNA SPEC QL NAA+PROBE: NEGATIVE
FLUBV RNA RESP QL NAA+PROBE: NEGATIVE
SARS-COV-2 RNA RESP QL NAA+PROBE: POSITIVE

## 2021-12-13 PROCEDURE — 71045 X-RAY EXAM CHEST 1 VIEW: CPT

## 2021-12-13 PROCEDURE — 99284 EMERGENCY DEPT VISIT MOD MDM: CPT | Performed by: PHYSICIAN ASSISTANT

## 2021-12-13 PROCEDURE — C9803 HOPD COVID-19 SPEC COLLECT: HCPCS

## 2021-12-13 PROCEDURE — 99284 EMERGENCY DEPT VISIT MOD MDM: CPT | Mod: 25

## 2021-12-13 PROCEDURE — 87636 SARSCOV2 & INF A&B AMP PRB: CPT | Performed by: PHYSICIAN ASSISTANT

## 2021-12-13 RX ORDER — ESCITALOPRAM OXALATE 5 MG/1
5 TABLET ORAL DAILY
Qty: 30 TABLET | Refills: 0 | Status: SHIPPED | OUTPATIENT
Start: 2021-12-13 | End: 2021-12-27

## 2021-12-13 RX ORDER — ESCITALOPRAM OXALATE 5 MG/1
TABLET ORAL
Qty: 30 TABLET | Refills: 0 | OUTPATIENT
Start: 2021-12-13

## 2021-12-13 RX ORDER — ESCITALOPRAM OXALATE 5 MG/1
5 TABLET ORAL DAILY
Qty: 30 TABLET | Refills: 0 | OUTPATIENT
Start: 2021-12-13

## 2021-12-13 ASSESSMENT — ENCOUNTER SYMPTOMS
FEVER: 1
COUGH: 1
ARTHRALGIAS: 1
GASTROINTESTINAL NEGATIVE: 1
HEADACHES: 1
SHORTNESS OF BREATH: 1

## 2021-12-13 ASSESSMENT — MIFFLIN-ST. JEOR: SCORE: 1760.15

## 2021-12-13 NOTE — ED PROVIDER NOTES
History     Chief Complaint   Patient presents with     Fever     HPI  Brenda Rizzo is a 21 year old male who presents with complaints of productive cough for the past 3-4 days.  Pt then developed fevers this morning along with headache, and body aches.  Pt also felt slightly short of breath this morning.  Denies rash, nasal congestion, nausea, vomiting, diarrhea, abdominal pain, or chest pain.  Pt took Tylenol for his fever this morning and states he is feeling a little better.  Pt is not vaccinated against COVID-19.  Pt states his girlfriend is ill with similar symptoms.  He does not have history of asthma or underlying lung disease.      Allergies:  No Known Allergies    Problem List:    Patient Active Problem List    Diagnosis Date Noted     Anxiety and depression 11/26/2018     Priority: Medium     Supraventricular tachycardia (H) 06/04/2018     Priority: Medium     Palpitations 06/04/2018     Priority: Medium     Anxiety 06/04/2018     Priority: Medium        Past Medical History:    Past Medical History:   Diagnosis Date     Unspecified part of open fracture of clavicle 10/08/2004       Past Surgical History:    Past Surgical History:   Procedure Laterality Date     SURGICAL HISTORY OF -   2002    penoscrotal web repair       Family History:    Family History   Problem Relation Age of Onset     Hypertension Maternal Grandmother      Depression Maternal Grandmother        Social History:  Marital Status:  Single [1]  Social History     Tobacco Use     Smoking status: Former Smoker     Years: 1.00     Types: Vaping Device     Smokeless tobacco: Never Used   Vaping Use     Vaping Use: Former   Substance Use Topics     Alcohol use: No     Drug use: Not Currently     Types: Marijuana     Comment: daily        Medications:    escitalopram (LEXAPRO) 5 MG tablet  polyethylene glycol (MIRALAX) 17 GM/Dose powder          Review of Systems   Constitutional: Positive for fever.   HENT: Negative.    Respiratory:  "Positive for cough and shortness of breath.    Gastrointestinal: Negative.    Musculoskeletal: Positive for arthralgias.   Skin: Negative.    Neurological: Positive for headaches.   All other systems reviewed and are negative.      Physical Exam   BP: 119/49  Pulse: 85  Temp: 98  F (36.7  C)  Resp: 18  Height: 188 cm (6' 2\")  Weight: 68 kg (150 lb)  SpO2: 95 %      Physical Exam  Constitutional:       General: He is not in acute distress.     Appearance: Normal appearance. He is well-developed. He is not ill-appearing, toxic-appearing or diaphoretic.   HENT:      Head: Normocephalic and atraumatic.      Right Ear: Tympanic membrane, ear canal and external ear normal.      Left Ear: Tympanic membrane, ear canal and external ear normal.      Nose: Nose normal. No mucosal edema, congestion or rhinorrhea.      Mouth/Throat:      Lips: Pink.      Mouth: Mucous membranes are moist.      Pharynx: Oropharynx is clear. Uvula midline. No pharyngeal swelling, oropharyngeal exudate, posterior oropharyngeal erythema or uvula swelling.      Tonsils: No tonsillar exudate or tonsillar abscesses.   Eyes:      Extraocular Movements: Extraocular movements intact.      Conjunctiva/sclera: Conjunctivae normal.      Pupils: Pupils are equal, round, and reactive to light.   Neck:      Meningeal: Brudzinski's sign and Kernig's sign absent.   Cardiovascular:      Rate and Rhythm: Normal rate and regular rhythm.      Heart sounds: Normal heart sounds.   Pulmonary:      Effort: Pulmonary effort is normal. No respiratory distress.      Breath sounds: Normal breath sounds. No stridor. No wheezing, rhonchi or rales.   Musculoskeletal:         General: Normal range of motion.      Cervical back: Full passive range of motion without pain, normal range of motion and neck supple. No rigidity. Normal range of motion.   Lymphadenopathy:      Cervical: No cervical adenopathy.   Skin:     General: Skin is warm and dry.      Findings: No rash. "   Neurological:      Mental Status: He is alert and oriented to person, place, and time.   Psychiatric:         Mood and Affect: Mood normal.         Behavior: Behavior is cooperative.         ED Course               Procedures    Results for orders placed or performed during the hospital encounter of 12/13/21 (from the past 24 hour(s))   Symptomatic Influenza A/B & SARS-CoV2 (COVID-19) Virus PCR Multiplex Nasopharyngeal    Specimen: Nasopharyngeal; Swab   Result Value Ref Range    Influenza A PCR Negative Negative    Influenza B PCR Negative Negative    SARS CoV2 PCR Positive (A) Negative    Narrative    Testing was performed using the juan jose SARS-CoV-2 & Influenza A/B Assay on the juan jose Faye System. This test should be ordered for the detection of SARS-CoV-2 and influenza viruses in individuals who meet clinical and/or epidemiological criteria. Test performance is unknown in asymptomatic patients. This test is for in vitro diagnostic use under the FDA EUA for laboratories certified under CLIA to perform moderate and/or high complexity testing. This test has not been FDA cleared or approved. A negative result does not rule out the presence of PCR inhibitors in the specimen or target RNA in concentration below the limit of detection for the assay. If only one viral target is positive but coinfection with multiple targets is suspected, the sample should be re-tested with another FDA cleared, approved or authorized test, if coinfection would change clinical management. [facility] are certified under the Clinical Laboratory Improvement Amendments of 1988 (CLIA-88) as  qualified to perform moderate and/or high complexity laboratory testing.   XR Chest Port 1 View    Narrative    CHEST ONE VIEW  12/13/2021 11:51 AM     HISTORY: Cough, now with fevers.    COMPARISON: March 18, 2021      Impression    IMPRESSION: No acute disease.    IFTIKHAR MASCORRO MD         SYSTEM ID:  [id number]       Medications - No  data to display    Assessments & Plan (with Medical Decision Making)     Pt is a 21 year old male who presents with complaints of productive cough for the past 3-4 days.  Pt then developed fevers this morning along with headache, and body aches.  Pt also felt slightly short of breath this morning.  Denies rash, nasal congestion, nausea, vomiting, diarrhea, abdominal pain, or chest pain.  Pt took Tylenol for his fever this morning and states he is feeling a little better.  Pt is not vaccinated against COVID-19.  Pt states his girlfriend is ill with similar symptoms.      Pt is afebrile on arrival.  Exam as above.  CXR was negative for pneumonia or acute pathology.  COVID-19 testing was positive.  Discussed results with patient.  Pt is not hypoxic.  He is not in any respiratory distress.  Encouraged symptomatic treatments at home.  Get well loop referral was placed.  Return precautions were reviewed.  Hand-outs were provided.    Patient was instructed to follow-up with PCP virtually in 3-5 days for continued care and management or sooner if new or worsening symptoms.  He is to return to the ED for persistent and/or worsening symptoms.  Patient expressed understanding of the diagnosis and plan and was discharged home in good condition.    I have reviewed the nursing notes.    I have reviewed the findings, diagnosis, plan and need for follow up with the patient.    Discharge Medication List as of 12/13/2021 12:03 PM          Final diagnoses:   Infection due to 2019 novel coronavirus       12/13/2021   [facility] EMERGENCY DEPT      Disclaimer:  This note consists of symbols derived from keyboarding, dictation and/or voice recognition software.  As a result, there may be errors in the script that have gone undetected.  Please consider this when interpreting information found in this chart.     Laura Henry PA-C  12/13/21 [id number]

## 2021-12-13 NOTE — ED NOTES
Pt here with cough four days and fever started this morning. Pt took ibuprofen prior to arrival and is now feeling better.

## 2021-12-13 NOTE — TELEPHONE ENCOUNTER
Dr. Leroy,    Routing refill request to provider for review/approval because:  Namita given x1 and patient did not follow up.   Looks like patient has a 12/27/21 appt with you now.  Medication pended below. Lurdes HILLIARD RN

## 2021-12-13 NOTE — LETTER
December 13, 2021      To Whom It May Concern:      Brenda Rizzo was seen in our Emergency Department today, 12/13/21.  Please excuse patient for the next 10 days since he tested positive for COVID-19.  Thank you.      Sincerely,        Laura Henry PA-C

## 2021-12-27 ENCOUNTER — VIRTUAL VISIT (OUTPATIENT)
Dept: FAMILY MEDICINE | Facility: CLINIC | Age: 21
End: 2021-12-27
Payer: COMMERCIAL

## 2021-12-27 DIAGNOSIS — F41.9 ANXIETY AND DEPRESSION: ICD-10-CM

## 2021-12-27 DIAGNOSIS — F32.A ANXIETY AND DEPRESSION: ICD-10-CM

## 2021-12-27 PROCEDURE — 99214 OFFICE O/P EST MOD 30 MIN: CPT | Mod: 95 | Performed by: FAMILY MEDICINE

## 2021-12-27 RX ORDER — ESCITALOPRAM OXALATE 10 MG/1
10 TABLET ORAL DAILY
Qty: 90 TABLET | Refills: 0 | Status: SHIPPED | OUTPATIENT
Start: 2021-12-27 | End: 2022-03-16

## 2021-12-27 ASSESSMENT — ANXIETY QUESTIONNAIRES
GAD7 TOTAL SCORE: 10
7. FEELING AFRAID AS IF SOMETHING AWFUL MIGHT HAPPEN: SEVERAL DAYS
IF YOU CHECKED OFF ANY PROBLEMS ON THIS QUESTIONNAIRE, HOW DIFFICULT HAVE THESE PROBLEMS MADE IT FOR YOU TO DO YOUR WORK, TAKE CARE OF THINGS AT HOME, OR GET ALONG WITH OTHER PEOPLE: SOMEWHAT DIFFICULT
5. BEING SO RESTLESS THAT IT IS HARD TO SIT STILL: SEVERAL DAYS
2. NOT BEING ABLE TO STOP OR CONTROL WORRYING: MORE THAN HALF THE DAYS
1. FEELING NERVOUS, ANXIOUS, OR ON EDGE: SEVERAL DAYS
3. WORRYING TOO MUCH ABOUT DIFFERENT THINGS: MORE THAN HALF THE DAYS
6. BECOMING EASILY ANNOYED OR IRRITABLE: MORE THAN HALF THE DAYS

## 2021-12-27 ASSESSMENT — PATIENT HEALTH QUESTIONNAIRE - PHQ9
SUM OF ALL RESPONSES TO PHQ QUESTIONS 1-9: 12
5. POOR APPETITE OR OVEREATING: SEVERAL DAYS

## 2021-12-27 NOTE — PATIENT INSTRUCTIONS
After today's discussion, you preferred to increase escitalopram to 10 mg daily.    Observer for improvement and possible side effects.  See provider if with significant bothersome side effects.    Otherwise follow up with Dr. Leroy with another virtual visit in early March 2022 for medication management.  Patient Education     Escitalopram Oxalate Oral Tablet 10 mg  Uses  This medicine is used for the following purposes:    anxiety    depression    eating disorders    menopausal symptoms    obsessive compulsive disorder    post-traumatic stress disorder  Instructions  This medicine may be taken with or without food.  It is very important that you take the medicine at about the same time every day. It will work best if you do this.  Keep the medicine at room temperature. Avoid heat and direct light.  It may take several weeks for this medicine to fully work.  It is important that you keep taking each dose of this medicine on time even if you are feeling well.  If you forget to take a dose on time, take it as soon as you remember. If it is almost time for the next dose, do not take the missed dose. Return to your normal dosing schedule. Do not take 2 doses of this medicine at one time.  Please tell your doctor and pharmacist about all the medicines you take. Include both prescription and over-the-counter medicines. Also tell them about any vitamins, herbal medicines, or anything else you take for your health.  Do not suddenly stop taking this medicine. Check with your doctor before stopping.  Cautions  Tell your doctor and pharmacist if you ever had an allergic reaction to a medicine. Symptoms of an allergic reaction can include trouble breathing, skin rash, itching, swelling, or severe dizziness.  Do not use the medication any more than instructed.  Your ability to stay alert or to react quickly may be impaired by this medicine. Do not drive or operate machinery until you know how this medicine will affect you.  Do  not drink beverages with alcohol while on this medicine.  Family should check on the patient often. Call the doctor if patient becomes more depressed, has thoughts of suicide, or shows changes in behavior.  Call the doctor if there are any signs of confusion or unusual changes in behavior.  Tell the doctor or pharmacist if you are pregnant, planning to be pregnant, or breastfeeding.  Ask your pharmacist if this medicine can interact with any of your other medicines. Be sure to tell them about all the medicines you take.  Do not start or stop any other medicines without first speaking to your doctor or pharmacist.  Do not share this medicine with anyone who has not been prescribed this medicine.  This medicine can cause serious side effects in some patients. Important information from the U[place]S. Food and Drug Administration (FDA) is available from your pharmacist. Please review it carefully with your pharmacist to understand the risks associated with this medicine.  Side Effects  The following is a list of some common side effects from this medicine. Please speak with your doctor about what you should do if you experience these or other side effects.    agitated feeling or trouble sleeping    constipation    dizziness    drowsiness or sedation    dry mouth    lack of energy and tiredness    nausea    stomach upset or abdominal pain    sweating    vomiting  Call your doctor or get medical help right away if you notice any of these more serious side effects:    bleeding that is severe or takes longer to stop    confusion    severe or persistent constipation    pain in the eye    fainting    hallucinations (unusual thoughts, seeing or hearing things that are not real)    fast or irregular heart beats    muscle aches, spasms or abnormal movements    muscle weakness    dilation of the pupils    problems with sexual functions or desire    blood in stool    dark, tarry stool    blurring or changes of vision    severe or  persistent vomiting  A few people may have an allergic reactions to this medicine. Symptoms can include difficulty breathing, skin rash, itching, swelling, or severe dizziness. If you notice any of these symptoms, seek medical help quickly.  Extra  Please speak with your doctor, nurse, or pharmacist if you have any questions about this medicine.  [email or web address]  IMPORTANT NOTE: This document tells you briefly how to take your medicine, but it does not tell you all there is to know about it.Your doctor or pharmacist may give you other documents about your medicine. Please talk to them if you have any questions.Always follow their advice. There is a more complete description of this medicine available in English.Scan this code on your smartphone or tablet or use the web address below. You can also ask your pharmacist for a printout. If you have any questions, please ask your pharmacist.     2021 [employer].           Patient Education     Anxiety Reaction  Anxiety is the feeling we all get when we think something bad might happen. It is a normal response to stress and normally causes only a mild reaction. When anxiety becomes more severe, it can interfere with daily life. In some cases, you may not even be aware of what you re anxious about. There may also be a genetic link. Or it may be a learned behavior in the home.   Both psychological and physical triggers cause stress reaction. It's often a response to fear or emotional stress, real or imagined. This stress may come from home, family, work, or social relationships.   During an anxiety reaction, you may feel:    Helpless    Nervous    Depressed    Grouchy  Your body may show signs of anxiety in many ways. You may experience:    Dry mouth    Shakiness    Dizziness    Weakness    Trouble breathing    Breathing fast (hyperventilating)    Chest pressure    Sweating    Headache    Nausea    Diarrhea    Tiredness    Inability to  sleep    Sexual problems  Home care    Try to find the sources of stress in your life. They may not be obvious. These may include:  ? Daily hassles of life (such as traffic jams, missed appointments, or car troubles)  ? Major life changes, both good (new baby or job promotion) and bad (loss of job or loss of loved one)  ? Overload (feeling that you have too many responsibilities and can't take care of all of them at once)  ? Feeling helpless or feeling that your problems can't be solved    Notice how your body reacts to stress. Learn to listen to your body signals. This will help you take action before the stress becomes severe.    When you can, do something about the source of your stress. (Avoid hassles, limit the amount of change that happens in your life at one time, and take a break when you feel overloaded).    Unfortunately, many stressful situations can't be avoided. It is necessary to learn how to better manage stress. There are many proven methods that will reduce your anxiety. These include simple things such as exercise, good nutrition, and adequate rest. Also, there are certain techniques that are helpful:  ? Relaxation  ? Breathing exercises  ? Visualization  ? Biofeedback  ? Meditation  For more information about this, talk with your healthcare provider. Or check online or at your local library or bookstore. You'll find many books and audiobooks on this subject.   Follow-up care  If you feel your anxiety is not responding to self-help measures, call your healthcare provider or make an appointment with a counselor. You may need short-term psychological counseling or medicine to help you manage stress.   Call 911  Call 911 if any of these happen:     Trouble breathing    Confusion    Drowsiness or trouble waking up    Fainting or loss of consciousness    Rapid heart rate    Seizure    New chest pain that becomes more severe, lasts longer, or spreads into your shoulder, arm, neck, jaw, or back  When to  get medical advice  Call your healthcare provider right away if any of these happen:    Your symptoms get worse    Severe headache not eased by rest and mild pain reliever  Dion last reviewed this educational content on 4/1/2020 2000-2021 [employer]. All rights reserved. This information is not intended as a substitute for professional medical care. Always follow your healthcare professional's instructions.

## 2021-12-27 NOTE — PROGRESS NOTES
Brenda is a 21 year old who is being evaluated via a billable video visit.      How would you like to obtain your AVS? MyChart  If the video visit is dropped, the invitation should be resent by: Text to cell phone: [phone]  Will anyone else be joining your video visit? No    Video Start Time: 4:17 PM    Assessment & Plan     Anxiety and depression  Uncontrolled anxiety still . Patient deneis depression now.  Discussed with patient possible increase in dose. He concurs to adjust dose of ssri.  Advised possible ADR to higher doses and when to seek medical attention.  Patient reassures provider no suicidal thoughts today.  Return precautions discussed and given to patient.   - escitalopram (LEXAPRO) 10 MG tablet  Dispense: 90 tablet; Refill: 0  6}     Depression Screening Follow Up    PHQ 12/27/2021   PHQ-9 Total Score 12   Q9: Thoughts of better off dead/self-harm past 2 weeks Not at all     Last PHQ-9 12/27/2021   1.  Little interest or pleasure in doing things 2   2.  Feeling down, depressed, or hopeless 1   3.  Trouble falling or staying asleep, or sleeping too much 2   4.  Feeling tired or having little energy 2   5.  Poor appetite or overeating 1   6.  Feeling bad about yourself 1   7.  Trouble concentrating 2   8.  Moving slowly or restless 1   Q9: Thoughts of better off dead/self-harm past 2 weeks 0   PHQ-9 Total Score 12   Difficulty at work, home, or with people Somewhat difficult       Patient Instructions   After today's discussion, you preferred to increase escitalopram to 10 mg daily.    Observer for improvement and possible side effects.  See provider if with significant bothersome side effects.    Otherwise follow up with Dr. Leroy with another virtual visit in early March 2022 for medication management.  Patient Education     Escitalopram Oxalate Oral Tablet 10 mg  Uses  This medicine is used for the following purposes:    anxiety    depression    eating disorders    menopausal symptoms    obsessive  compulsive disorder    post-traumatic stress disorder  Instructions  This medicine may be taken with or without food.  It is very important that you take the medicine at about the same time every day. It will work best if you do this.  Keep the medicine at room temperature. Avoid heat and direct light.  It may take several weeks for this medicine to fully work.  It is important that you keep taking each dose of this medicine on time even if you are feeling well.  If you forget to take a dose on time, take it as soon as you remember. If it is almost time for the next dose, do not take the missed dose. Return to your normal dosing schedule. Do not take 2 doses of this medicine at one time.  Please tell your doctor and pharmacist about all the medicines you take. Include both prescription and over-the-counter medicines. Also tell them about any vitamins, herbal medicines, or anything else you take for your health.  Do not suddenly stop taking this medicine. Check with your doctor before stopping.  Cautions  Tell your doctor and pharmacist if you ever had an allergic reaction to a medicine. Symptoms of an allergic reaction can include trouble breathing, skin rash, itching, swelling, or severe dizziness.  Do not use the medication any more than instructed.  Your ability to stay alert or to react quickly may be impaired by this medicine. Do not drive or operate machinery until you know how this medicine will affect you.  Do not drink beverages with alcohol while on this medicine.  Family should check on the patient often. Call the doctor if patient becomes more depressed, has thoughts of suicide, or shows changes in behavior.  Call the doctor if there are any signs of confusion or unusual changes in behavior.  Tell the doctor or pharmacist if you are pregnant, planning to be pregnant, or breastfeeding.  Ask your pharmacist if this medicine can interact with any of your other medicines. Be sure to tell them about all the  medicines you take.  Do not start or stop any other medicines without first speaking to your doctor or pharmacist.  Do not share this medicine with anyone who has not been prescribed this medicine.  This medicine can cause serious side effects in some patients. Important information from the U[place]S. Food and Drug Administration (FDA) is available from your pharmacist. Please review it carefully with your pharmacist to understand the risks associated with this medicine.  Side Effects  The following is a list of some common side effects from this medicine. Please speak with your doctor about what you should do if you experience these or other side effects.    agitated feeling or trouble sleeping    constipation    dizziness    drowsiness or sedation    dry mouth    lack of energy and tiredness    nausea    stomach upset or abdominal pain    sweating    vomiting  Call your doctor or get medical help right away if you notice any of these more serious side effects:    bleeding that is severe or takes longer to stop    confusion    severe or persistent constipation    pain in the eye    fainting    hallucinations (unusual thoughts, seeing or hearing things that are not real)    fast or irregular heart beats    muscle aches, spasms or abnormal movements    muscle weakness    dilation of the pupils    problems with sexual functions or desire    blood in stool    dark, tarry stool    blurring or changes of vision    severe or persistent vomiting  A few people may have an allergic reactions to this medicine. Symptoms can include difficulty breathing, skin rash, itching, swelling, or severe dizziness. If you notice any of these symptoms, seek medical help quickly.  Extra  Please speak with your doctor, nurse, or pharmacist if you have any questions about this medicine.  [email or web address]  IMPORTANT NOTE: This document tells you briefly how to take your medicine, but it does not tell you all there is  to know about it.Your doctor or pharmacist may give you other documents about your medicine. Please talk to them if you have any questions.Always follow their advice. There is a more complete description of this medicine available in English.Scan this code on your smartphone or tablet or use the web address below. You can also ask your pharmacist for a printout. If you have any questions, please ask your pharmacist.     2021 [employer].           Patient Education     Anxiety Reaction  Anxiety is the feeling we all get when we think something bad might happen. It is a normal response to stress and normally causes only a mild reaction. When anxiety becomes more severe, it can interfere with daily life. In some cases, you may not even be aware of what you re anxious about. There may also be a genetic link. Or it may be a learned behavior in the home.   Both psychological and physical triggers cause stress reaction. It's often a response to fear or emotional stress, real or imagined. This stress may come from home, family, work, or social relationships.   During an anxiety reaction, you may feel:    Helpless    Nervous    Depressed    Grouchy  Your body may show signs of anxiety in many ways. You may experience:    Dry mouth    Shakiness    Dizziness    Weakness    Trouble breathing    Breathing fast (hyperventilating)    Chest pressure    Sweating    Headache    Nausea    Diarrhea    Tiredness    Inability to sleep    Sexual problems  Home care    Try to find the sources of stress in your life. They may not be obvious. These may include:  ? Daily hassles of life (such as traffic jams, missed appointments, or car troubles)  ? Major life changes, both good (new baby or job promotion) and bad (loss of job or loss of loved one)  ? Overload (feeling that you have too many responsibilities and can't take care of all of them at once)  ? Feeling helpless or feeling that your problems can't be solved    Notice how  your body reacts to stress. Learn to listen to your body signals. This will help you take action before the stress becomes severe.    When you can, do something about the source of your stress. (Avoid hassles, limit the amount of change that happens in your life at one time, and take a break when you feel overloaded).    Unfortunately, many stressful situations can't be avoided. It is necessary to learn how to better manage stress. There are many proven methods that will reduce your anxiety. These include simple things such as exercise, good nutrition, and adequate rest. Also, there are certain techniques that are helpful:  ? Relaxation  ? Breathing exercises  ? Visualization  ? Biofeedback  ? Meditation  For more information about this, talk with your healthcare provider. Or check online or at your local library or bookstore. You'll find many books and audiobooks on this subject.   Follow-up care  If you feel your anxiety is not responding to self-help measures, call your healthcare provider or make an appointment with a counselor. You may need short-term psychological counseling or medicine to help you manage stress.   Call 911  Call 911 if any of these happen:     Trouble breathing    Confusion    Drowsiness or trouble waking up    Fainting or loss of consciousness    Rapid heart rate    Seizure    New chest pain that becomes more severe, lasts longer, or spreads into your shoulder, arm, neck, jaw, or back  When to get medical advice  Call your healthcare provider right away if any of these happen:    Your symptoms get worse    Severe headache not eased by rest and mild pain reliever  Dion last reviewed this educational content on 4/1/2020 2000-2021 [employer]. All rights reserved. This information is not intended as a substitute for professional medical care. Always follow your healthcare professional's instructions.               Return in about 10 weeks (around 3/7/2022) for Virtual visit  for medication management.    Dallas Leroy MD  [facility] BARBARA Gutierrez is a 21 year old who presents for the following health issues  Chief Complaint   Patient presents with     Anxiety     Pt being seen for a f/u on depression and anxiety.       HPI     Depression and Anxiety Follow-Up    How are you doing with your depression since your last visit? Improved , feels more stable    How are you doing with your anxiety since your last visit?  Pt is more aware of anxiety so tends to focus on it more.    Are you having other symptoms that might be associated with depression or anxiety? Yes:  struggles with talking to new people, gets nervous in new situations    Have you had a significant life event? Grief or Loss, lost mom about 1 month ago       Do you have any concerns with your use of alcohol or other drugs? No    Social History     Tobacco Use     Smoking status: Former Smoker     Years: 1.00     Types: Vaping Device     Smokeless tobacco: Never Used   Vaping Use     Vaping Use: Former   Substance Use Topics     Alcohol use: No     Drug use: Not Currently     Types: Marijuana     Comment: daily     PHQ 11/19/2018 10/11/2021 12/13/2021   PHQ-9 Total Score 11 16 10   Q9: Thoughts of better off dead/self-harm past 2 weeks Not at all Several days Not at all     PETROS-7 SCORE 10/22/2018 11/19/2018 10/11/2021   Total Score 9 15 16     Last PHQ-9 12/27/2021   1.  Little interest or pleasure in doing things 2   2.  Feeling down, depressed, or hopeless 1   3.  Trouble falling or staying asleep, or sleeping too much 2   4.  Feeling tired or having little energy 2   5.  Poor appetite or overeating 1   6.  Feeling bad about yourself 1   7.  Trouble concentrating 2   8.  Moving slowly or restless 1   Q9: Thoughts of better off dead/self-harm past 2 weeks 0   PHQ-9 Total Score 12   Difficulty at work, home, or with people Somewhat difficult     PETROS-7  12/27/2021   1. Feeling nervous, anxious,  or on edge 1   2. Not being able to stop or control worrying 2   3. Worrying too much about different things 2   4. Trouble relaxing 1   5. Being so restless that it is hard to sit still 1   6. Becoming easily annoyed or irritable 2   7. Feeling afraid, as if something awful might happen 1   PETROS-7 Total Score 10   If you checked any problems, how difficult have they made it for you to do your work, take care of things at home, or get along with other people? Somewhat difficult       Suicide Assessment Five-step Evaluation and Treatment (SAFE-T)      How many servings of fruits and vegetables do you eat daily?  2-3    On average, how many sweetened beverages do you drink each day (Examples: soda, juice, sweet tea, etc.  Do NOT count diet or artificially sweetened beverages)?   1 energy drink    How many days per week do you exercise enough to make your heart beat faster? None, active at work    How many minutes a day do you exercise enough to make your heart beat faster? none    How many days per week do you miss taking your medication? 0  Review of Systems   C: NEGATIVE for fever, chills, change in weight  I: NEGATIVE for worrisome rashes, moles or lesions  E: NEGATIVE for vision changes or irritation  E/M: NEGATIVE for ear, mouth and throat problems  R: NEGATIVE for significant cough or SOB  CV: NEGATIVE for chest pain, palpitations or peripheral edema  GI: NEGATIVE for nausea, abdominal pain, heartburn, or change in bowel habits  : NEGATIVE for frequency, dysuria, or hematuria  N: NEGATIVE for weakness, dizziness or paresthesias  E: NEGATIVE for temperature intolerance, skin/hair changes  PSYCHIATRIC: see above      Objective           Vitals:  No vitals were obtained today due to virtual visit.    Physical Exam   GENERAL: Healthy, alert and no distress  EYES: Eyes grossly normal to inspection.  No discharge or erythema, or obvious scleral/conjunctival abnormalities.  RESP: No audible wheeze, cough, or visible  cyanosis.  No visible retractions or increased work of breathing.    SKIN: Visible skin clear. No significant rash, abnormal pigmentation or lesions.  NEURO: Cranial nerves grossly intact.  Mentation and speech appropriate for age.  PSYCH: Mentation appears normal, affect normal/bright, judgement and insight intact, normal speech and appearance well-groomed.    No results found for any visits on 12/27/21.        Video-Visit Details    Type of service:  Video Visit    Video End Time:4:29 PM    Originating Location (pt. Location): Home    Distant Location (provider location):  [facility]     Platform used for Video Visit: [employer]

## 2021-12-28 ASSESSMENT — ANXIETY QUESTIONNAIRES: GAD7 TOTAL SCORE: 10

## 2022-03-16 ENCOUNTER — OFFICE VISIT (OUTPATIENT)
Dept: FAMILY MEDICINE | Facility: CLINIC | Age: 22
End: 2022-03-16
Payer: COMMERCIAL

## 2022-03-16 ENCOUNTER — MYC MEDICAL ADVICE (OUTPATIENT)
Dept: FAMILY MEDICINE | Facility: CLINIC | Age: 22
End: 2022-03-16

## 2022-03-16 VITALS
SYSTOLIC BLOOD PRESSURE: 106 MMHG | HEART RATE: 56 BPM | HEIGHT: 74 IN | OXYGEN SATURATION: 99 % | RESPIRATION RATE: 20 BRPM | BODY MASS INDEX: 21.05 KG/M2 | TEMPERATURE: 97.3 F | WEIGHT: 164 LBS | DIASTOLIC BLOOD PRESSURE: 64 MMHG

## 2022-03-16 DIAGNOSIS — F41.9 ANXIETY AND DEPRESSION: ICD-10-CM

## 2022-03-16 DIAGNOSIS — S66.911A STRAIN OF BOTH WRISTS, INITIAL ENCOUNTER: ICD-10-CM

## 2022-03-16 DIAGNOSIS — M25.531 PAIN IN BOTH WRISTS: Primary | ICD-10-CM

## 2022-03-16 DIAGNOSIS — I47.10 SUPRAVENTRICULAR TACHYCARDIA (H): ICD-10-CM

## 2022-03-16 DIAGNOSIS — M25.532 PAIN IN BOTH WRISTS: Primary | ICD-10-CM

## 2022-03-16 DIAGNOSIS — F32.A ANXIETY AND DEPRESSION: ICD-10-CM

## 2022-03-16 DIAGNOSIS — Z11.4 SCREENING FOR HUMAN IMMUNODEFICIENCY VIRUS: ICD-10-CM

## 2022-03-16 DIAGNOSIS — Z11.59 NEED FOR HEPATITIS C SCREENING TEST: ICD-10-CM

## 2022-03-16 DIAGNOSIS — S66.912A STRAIN OF BOTH WRISTS, INITIAL ENCOUNTER: ICD-10-CM

## 2022-03-16 LAB
HCV AB SERPL QL IA: NONREACTIVE
HIV 1+2 AB+HIV1 P24 AG SERPL QL IA: NONREACTIVE

## 2022-03-16 PROCEDURE — 87389 HIV-1 AG W/HIV-1&-2 AB AG IA: CPT | Performed by: FAMILY MEDICINE

## 2022-03-16 PROCEDURE — 99214 OFFICE O/P EST MOD 30 MIN: CPT | Performed by: FAMILY MEDICINE

## 2022-03-16 PROCEDURE — 86803 HEPATITIS C AB TEST: CPT | Performed by: FAMILY MEDICINE

## 2022-03-16 PROCEDURE — 36415 COLL VENOUS BLD VENIPUNCTURE: CPT | Performed by: FAMILY MEDICINE

## 2022-03-16 RX ORDER — ESCITALOPRAM OXALATE 10 MG/1
10 TABLET ORAL DAILY
Qty: 90 TABLET | Refills: 3 | Status: SHIPPED | OUTPATIENT
Start: 2022-03-16 | End: 2023-06-01

## 2022-03-16 ASSESSMENT — ANXIETY QUESTIONNAIRES
2. NOT BEING ABLE TO STOP OR CONTROL WORRYING: NOT AT ALL
5. BEING SO RESTLESS THAT IT IS HARD TO SIT STILL: NOT AT ALL
IF YOU CHECKED OFF ANY PROBLEMS ON THIS QUESTIONNAIRE, HOW DIFFICULT HAVE THESE PROBLEMS MADE IT FOR YOU TO DO YOUR WORK, TAKE CARE OF THINGS AT HOME, OR GET ALONG WITH OTHER PEOPLE: SOMEWHAT DIFFICULT
3. WORRYING TOO MUCH ABOUT DIFFERENT THINGS: MORE THAN HALF THE DAYS
GAD7 TOTAL SCORE: 6
1. FEELING NERVOUS, ANXIOUS, OR ON EDGE: SEVERAL DAYS
7. FEELING AFRAID AS IF SOMETHING AWFUL MIGHT HAPPEN: SEVERAL DAYS
6. BECOMING EASILY ANNOYED OR IRRITABLE: MORE THAN HALF THE DAYS

## 2022-03-16 ASSESSMENT — PAIN SCALES - GENERAL: PAINLEVEL: SEVERE PAIN (6)

## 2022-03-16 ASSESSMENT — PATIENT HEALTH QUESTIONNAIRE - PHQ9
SUM OF ALL RESPONSES TO PHQ QUESTIONS 1-9: 9
5. POOR APPETITE OR OVEREATING: NOT AT ALL

## 2022-03-16 NOTE — PATIENT INSTRUCTIONS
Occupational therapy  will call you in 2-3 business days.  If no improvement after 6-8 weeks of therapy, follow up in clinic.    Refilled lexapro today.    You agreed to screen for HIV and hepatitis C.  You will be contacted in 1-2 days for results of your lab tests.    Patient Education     Wrist Sprain  A sprain is an injury to the ligaments or capsule that holds a joint together. There are no broken bones. Most sprains take about 3 to 6 weeks to heal. If it a severe sprain where the ligament is completely torn, it can take months to recover.  Most wrist sprains are treated with a splint, wrist brace, or elastic wrap for support. Severe sprains may require surgery.  Home care    Keep your arm elevated to reduce pain and swelling. This is very important during the first 48 hours.    Apply an ice pack over the injured area for 15 to 20 minutes every 3 to 6 hours. You should do this for the first 24 to 48 hours. You can make an ice pack by filling a plastic bag that seals at the top with ice cubes and then wrapping it with a thin towel. Continue to use ice packs for relief of pain and swelling as needed. As the ice melts, be careful to avoid getting your wrap, splint, or cast wet. After 48 hours, apply heat (warm shower or warm bath) for 15 to 20 minutes several times a day, or alternate ice and heat.     You may use over-the-counter pain medicine to control pain, unless another pain medicine was prescribed. If you have chronic liver or kidney disease or ever had a stomach ulcer or gastrointestinal bleeding, talk with your doctor before using these medicines.    If you were given a splint or brace, wear it for the time advised by your doctor.  Follow-up care  Follow up with your healthcare provider, or as advised. Any X-rays you had today don t show any broken bones, breaks, or fractures. Sometimes fractures don t show up on the first X-ray. Bruises and sprains can sometimes hurt as much as a fracture. These  injuries can take time to heal completely. If your symptoms don t improve or they get worse, talk with your doctor. You may need a repeat X-ray. If X-rays were taken, you will be told of any new findings that may affect your care.  When to seek medical advice  Call your healthcare provider right away if any of these occur:    Pain or swelling increases    Fingers or hand becomes cold, blue, numb, or tingly   Dion last reviewed this educational content on 5/1/2018 2000-2021 [employer]. All rights reserved. This information is not intended as a substitute for professional medical care. Always follow your healthcare professional's instructions.

## 2022-03-16 NOTE — LETTER
Brenda CANDICE Simsu  [address] [facility] [address]          To Whom This May Concern:       Brenda was seen in the clinic today, March 16, 2022, for a medical evaluation.      Yours truly,      Dallas Leroy M.D.

## 2022-03-17 ASSESSMENT — ANXIETY QUESTIONNAIRES: GAD7 TOTAL SCORE: 6

## 2022-03-27 ENCOUNTER — OFFICE VISIT (OUTPATIENT)
Dept: URGENT CARE | Facility: URGENT CARE | Age: 22
End: 2022-03-27
Payer: COMMERCIAL

## 2022-03-27 VITALS
SYSTOLIC BLOOD PRESSURE: 119 MMHG | WEIGHT: 165 LBS | TEMPERATURE: 98.5 F | OXYGEN SATURATION: 97 % | HEART RATE: 68 BPM | BODY MASS INDEX: 21.04 KG/M2 | DIASTOLIC BLOOD PRESSURE: 74 MMHG

## 2022-03-27 DIAGNOSIS — R07.0 THROAT PAIN: ICD-10-CM

## 2022-03-27 DIAGNOSIS — J06.9 UPPER RESPIRATORY TRACT INFECTION, UNSPECIFIED TYPE: Primary | ICD-10-CM

## 2022-03-27 LAB
DEPRECATED S PYO AG THROAT QL EIA: NEGATIVE
GROUP A STREP BY PCR: NOT DETECTED

## 2022-03-27 PROCEDURE — 99213 OFFICE O/P EST LOW 20 MIN: CPT | Mod: CS | Performed by: FAMILY MEDICINE

## 2022-03-27 PROCEDURE — 87651 STREP A DNA AMP PROBE: CPT | Performed by: FAMILY MEDICINE

## 2022-03-27 PROCEDURE — U0005 INFEC AGEN DETEC AMPLI PROBE: HCPCS | Performed by: FAMILY MEDICINE

## 2022-03-27 PROCEDURE — U0003 INFECTIOUS AGENT DETECTION BY NUCLEIC ACID (DNA OR RNA); SEVERE ACUTE RESPIRATORY SYNDROME CORONAVIRUS 2 (SARS-COV-2) (CORONAVIRUS DISEASE [COVID-19]), AMPLIFIED PROBE TECHNIQUE, MAKING USE OF HIGH THROUGHPUT TECHNOLOGIES AS DESCRIBED BY CMS-2020-01-R: HCPCS | Performed by: FAMILY MEDICINE

## 2022-03-27 NOTE — PROGRESS NOTES
Assessment & Plan     Upper respiratory tract infection, unspecified type  Discussed in detail differentials and further management. Symptoms are likely secondary to viral infection.  Rapid strep negative, COVID-19 test ordered.  Recommended well hydration, over-the-counter analgesia, warm fluids, steam inhalation and saline gargles. Follow up if symptoms persist or worsen. Written instructions/information provided. Patient understood and in agreement with the above plan. All questionsanswered.       Throat pain  - Streptococcus A Rapid Screen w/Reflex to PCR - Clinic Collect  - Symptomatic; Unknown COVID-19 Virus (Coronavirus) by PCR Nose  - Group A Streptococcus PCR Throat Swab      Delonte Lott MD  [facility]    Pepito Gutierrez is a 21 year old who presents for the following health issues     HPI     Concern -   Onset: 2 days   Description: sore throat, congestion, facial pain and mild cough  Intensity: moderate  Progression of Symptoms:  same  Accompanying Signs & Symptoms: no fever, chills, sob, chest pain or palpitation   Therapies tried and outcome: None      Review of Systems   Constitutional, HEENT, cardiovascular, pulmonary, gi and gu systems are negative, except as otherwise noted.      Objective    /74   Pulse 68   Temp 98.5  F (36.9  C)   Wt 74.8 kg (165 lb)   SpO2 97%   BMI 21.04 kg/m    Body mass index is 21.04 kg/m .  Physical Exam   GENERAL: alert and no distress  EYES: Eyes grossly normal to inspection, PERRL and conjunctivae and sclerae normal  HENT: normal cephalic/atraumatic, ear canals and TM's normal, nasal mucosa edematous , rhinorrhea clear, oropharxnx crowded and sinuses: not tender  NECK: no adenopathy, no asymmetry, masses, or scars and thyroid normal to palpation  RESP: lungs clear to auscultation - no rales, rhonchi or wheezes  CV: regular rate and rhythm, normal S1 S2, no S3 or S4, no murmur, click or rub  ABDOMEN: soft, nontender, no  hepatosplenomegaly  MS: no gross musculoskeletal defects noted, no edema

## 2022-03-27 NOTE — PATIENT INSTRUCTIONS

## 2022-03-27 NOTE — LETTER
March 27, 2022      Brenda Rizzo  [address] [facility] [address]        To Whom It May Concern:      Brenda Rizzo was seen in our clinic. Kindly excuse his work absence for today and tomorrow.   Let us know if there are any questions.      Sincerely,        Delonte Lott MD

## 2022-03-28 LAB — SARS-COV-2 RNA RESP QL NAA+PROBE: NEGATIVE

## 2022-04-20 NOTE — TELEPHONE ENCOUNTER
"Requested Prescriptions   Pending Prescriptions Disp Refills     FLUoxetine (PROZAC) 20 MG capsule 60 capsule 0     Sig: Take 1 capsule (20 mg) by mouth daily       SSRIs Protocol Failed - 9/15/2021 11:41 PM        Failed - PHQ-9 score less than 5 in past 6 months     Please review last PHQ-9 score.           Failed - Recent (6 mo) or future (30 days) visit within the authorizing provider's specialty     Patient had office visit in the last 6 months or has a visit in the next 30 days with authorizing provider or within the authorizing provider's specialty.  See \"Patient Info\" tab in inbasket, or \"Choose Columns\" in Meds & Orders section of the refill encounter.            Passed - Medication is active on med list        Passed - Patient is age 18 or older             "
Last seen in clinic in 2018.  Patient needs to reestablish care.  Patient needs an appointment.  
No

## 2022-04-30 ENCOUNTER — HEALTH MAINTENANCE LETTER (OUTPATIENT)
Age: 22
End: 2022-04-30

## 2022-05-29 ENCOUNTER — APPOINTMENT (OUTPATIENT)
Dept: GENERAL RADIOLOGY | Facility: CLINIC | Age: 22
End: 2022-05-29
Attending: PHYSICIAN ASSISTANT
Payer: COMMERCIAL

## 2022-05-29 ENCOUNTER — HOSPITAL ENCOUNTER (EMERGENCY)
Facility: CLINIC | Age: 22
Discharge: HOME | End: 2022-05-29
Attending: PHYSICIAN ASSISTANT | Admitting: PHYSICIAN ASSISTANT
Payer: COMMERCIAL

## 2022-05-29 VITALS
WEIGHT: 170 LBS | BODY MASS INDEX: 21.82 KG/M2 | HEIGHT: 74 IN | OXYGEN SATURATION: 98 % | HEART RATE: 63 BPM | RESPIRATION RATE: 18 BRPM | TEMPERATURE: 97.8 F

## 2022-05-29 DIAGNOSIS — S50.312A ABRASION OF LEFT ELBOW, INITIAL ENCOUNTER: ICD-10-CM

## 2022-05-29 DIAGNOSIS — S60.511A ABRASION OF RIGHT HAND, INITIAL ENCOUNTER: ICD-10-CM

## 2022-05-29 DIAGNOSIS — M79.641 PAIN OF RIGHT HAND: ICD-10-CM

## 2022-05-29 PROCEDURE — 73130 X-RAY EXAM OF HAND: CPT | Mod: RT

## 2022-05-29 PROCEDURE — G0463 HOSPITAL OUTPT CLINIC VISIT: HCPCS | Performed by: PHYSICIAN ASSISTANT

## 2022-05-29 PROCEDURE — 73110 X-RAY EXAM OF WRIST: CPT | Mod: RT

## 2022-05-29 PROCEDURE — 99214 OFFICE O/P EST MOD 30 MIN: CPT | Performed by: PHYSICIAN ASSISTANT

## 2022-05-29 NOTE — DISCHARGE INSTRUCTIONS
Place bacitracin on abrasion injuries twice per day. Recommend over-the-counter Tylenol/ibuprofen as needed for pain control.  Elevate right hand as much as possible. Recommend applying ice to the affected area at 15-minute intervals for 48 hours post injury.     Recommend urgent medical evaluation if the patient develops worsening pain, pain out of proportion to injury, numbness or tingling or loss of feeling, or worsening redness/tenderness/swelling in the right hand or left elbow or evidence of worsening infection in the left elbow or right hand.

## 2022-05-29 NOTE — ED PROVIDER NOTES
History     Chief Complaint   Patient presents with     Hand Injury     HPI  Brenda Rizzo is a 21 year old male who presents to clinic with right hand pain following a fall from a skateboard this afternoon.  States that he was looking at his cell phone when his skateboard got caught on the sidewalk and he flew forward, landing on his right hand and wrist. Also landed on his left elbow and shoulder.  Has an abrasion injury on his left elbow but no shoulder pain.  Main concern is for pain at the CMC joint and thenar eminence of the right hand.  He has had some numbness and reduced sensation especially at the tip of the thumb which is mostly returned to normal at this point.  He has abrasion injuries on the outside of the left elbow and on the distal aspect of the right thumb.  Denies any other trauma to his head or neck.  He did feel like his left shoulder was out of joint this morning but the pain is now resolved.  No other trauma.  Last Tdap vaccine was in October 2021.    Allergies:  No Known Allergies    Problem List:    Patient Active Problem List    Diagnosis Date Noted     Anxiety and depression 11/26/2018     Priority: Medium     Supraventricular tachycardia (H) 06/04/2018     Priority: Medium     Palpitations 06/04/2018     Priority: Medium     Anxiety 06/04/2018     Priority: Medium        Past Medical History:    Past Medical History:   Diagnosis Date     Unspecified part of open fracture of clavicle 10/08/2004       Past Surgical History:    Past Surgical History:   Procedure Laterality Date     SURGICAL HISTORY OF -   2002    penoscrotal web repair       Family History:    Family History   Problem Relation Age of Onset     Hypertension Maternal Grandmother      Depression Maternal Grandmother        Social History:  Marital Status:  Single [1]  Social History     Tobacco Use     Smoking status: Former Smoker     Years: 1.00     Types: Vaping Device     Smokeless tobacco: Never Used   Vaping Use      "Vaping Use: Every day     Substances: Nicotine     Devices: RefOpGenble tank   Substance Use Topics     Alcohol use: No     Drug use: Not Currently     Types: Marijuana     Comment: daily        Medications:    escitalopram (LEXAPRO) 10 MG tablet      Review of Systems   Constitutional: Negative.    Musculoskeletal: Positive for arthralgias and myalgias.   Skin: Positive for wound.   Neurological: Negative.        Physical Exam   Pulse: 63  Temp: 97.8  F (36.6  C)  Resp: 18  Height: 188 cm (6' 2\")  Weight: 77.1 kg (170 lb)  SpO2: 98 %      Physical Exam  Constitutional:       General: He is not in acute distress.     Appearance: Normal appearance. He is not ill-appearing, toxic-appearing or diaphoretic.   HENT:      Head: Normocephalic and atraumatic.   Cardiovascular:      Pulses: Normal pulses.           Radial pulses are 2+ on the right side and 2+ on the left side.   Musculoskeletal:         General: No swelling.      Right shoulder: Normal.      Left shoulder: Normal.      Right wrist: No swelling, deformity, effusion, lacerations, tenderness, bony tenderness, snuff box tenderness or crepitus. Normal range of motion. Normal pulse.      Left wrist: Normal.      Right hand: Tenderness present. No swelling, deformity, lacerations or bony tenderness. Normal range of motion. Decreased strength. Normal strength of finger abduction, thumb/finger opposition and wrist extension. Normal sensation. Normal sensation of the ulnar distribution, median distribution and radial distribution. There is no disruption of two-point discrimination. Normal capillary refill. Normal pulse.      Left hand: Normal.      Cervical back: Normal range of motion and neck supple. No rigidity. No muscular tenderness.      Comments: Has mild tenderness over the CMC joint and the metacarpal phalangeal joint on the right thumb.  Normal range of motion in flexion, extension, opposition, abduction, abduction of the right thumb.  Reduced strength with " resisted motion of the thumb, strength is 4/5.   strength in the right hand is 4/5.  Has normal sensation in the right thumb, normal capillary refill.  Has an abrasion injury on the lateral aspect of the left elbow, has an abrasion injury on the dorsal aspect of the distal phalanx, right thumb.   Skin:     General: Skin is warm and dry.      Findings: No laceration, lesion or rash.   Neurological:      General: No focal deficit present.      Mental Status: He is alert and oriented to person, place, and time.      Sensory: No sensory deficit.      Motor: No weakness.      Coordination: Coordination normal.      Gait: Gait normal.         ED Course                 Procedures              No results found for this or any previous visit (from the past 24 hour(s)).    Medications - No data to display    Assessments & Plan (with Medical Decision Making)   Presentation and physical exam are consistent with right hand pain, likely soft tissue injury secondary to a fall.  No tenderness, neurologic deficits, or pain in the right shoulder.    X-rays of the right hand and wrist showed no evidence for fracture or acute dislocation.    Cleaned abrasion injuries with chlorhexidine.    Place bacitracin on abrasion injuries of right hand and left elbow twice per day.    I am discharging the patient with ACE wrap support the right hand.  Recommend over-the-counter Tylenol/ibuprofen as needed for pain control.  Elevate right hand as much as possible. Recommend applying ice to the affected area at 15-minute intervals for 48 hours post injury.     Recommend urgent medical evaluation if the patient develops worsening pain, pain out of proportion to injury, numbness or tingling or loss of feeling, or worsening redness/tenderness/swelling in the right hand or left elbow or evidence of worsening infection in the left elbow or right hand.    I have reviewed the nursing notes.    I have reviewed the findings, diagnosis, plan and need for  follow up with the patient.      Discharge Medication List as of 5/29/2022  4:38 PM          Final diagnoses:   Pain of right hand   Abrasion of right hand, initial encounter   Abrasion of left elbow, initial encounter       5/29/2022   [facility] EMERGENCY DEPT     Linden Zurita PA-C  06/02/22 1553

## 2022-05-29 NOTE — ED TRIAGE NOTES
Right hand injury after falling off skateboard.      Triage Assessment     Row Name 05/29/22 5097       Triage Assessment (Adult)    Airway WDL WDL       Skin Circulation/Temperature WDL    Skin Circulation/Temperature WDL X  abrasions       Cardiac WDL    Cardiac WDL WDL       Cognitive/Neuro/Behavioral WDL    Cognitive/Neuro/Behavioral WDL WDL

## 2022-06-02 ASSESSMENT — ENCOUNTER SYMPTOMS
CONSTITUTIONAL NEGATIVE: 1
ARTHRALGIAS: 1
WOUND: 1
NEUROLOGICAL NEGATIVE: 1
MYALGIAS: 1

## 2022-08-05 ENCOUNTER — HOSPITAL ENCOUNTER (EMERGENCY)
Facility: CLINIC | Age: 22
Discharge: HOME | End: 2022-08-05
Attending: PHYSICIAN ASSISTANT | Admitting: PHYSICIAN ASSISTANT
Payer: OTHER MISCELLANEOUS

## 2022-08-05 ENCOUNTER — APPOINTMENT (OUTPATIENT)
Dept: GENERAL RADIOLOGY | Facility: CLINIC | Age: 22
End: 2022-08-05
Attending: PHYSICIAN ASSISTANT
Payer: OTHER MISCELLANEOUS

## 2022-08-05 VITALS
TEMPERATURE: 97.2 F | DIASTOLIC BLOOD PRESSURE: 84 MMHG | BODY MASS INDEX: 21.82 KG/M2 | RESPIRATION RATE: 12 BRPM | HEART RATE: 53 BPM | OXYGEN SATURATION: 99 % | WEIGHT: 170 LBS | HEIGHT: 74 IN | SYSTOLIC BLOOD PRESSURE: 118 MMHG

## 2022-08-05 DIAGNOSIS — M54.2 NECK PAIN: ICD-10-CM

## 2022-08-05 DIAGNOSIS — M54.9 BACK PAIN: ICD-10-CM

## 2022-08-05 DIAGNOSIS — M79.641 RIGHT HAND PAIN: ICD-10-CM

## 2022-08-05 PROCEDURE — 72040 X-RAY EXAM NECK SPINE 2-3 VW: CPT

## 2022-08-05 PROCEDURE — 72072 X-RAY EXAM THORAC SPINE 3VWS: CPT

## 2022-08-05 PROCEDURE — G0463 HOSPITAL OUTPT CLINIC VISIT: HCPCS | Performed by: PHYSICIAN ASSISTANT

## 2022-08-05 PROCEDURE — 99214 OFFICE O/P EST MOD 30 MIN: CPT | Performed by: PHYSICIAN ASSISTANT

## 2022-08-05 RX ORDER — METHOCARBAMOL 500 MG/1
1000 TABLET, FILM COATED ORAL 4 TIMES DAILY
Qty: 32 TABLET | Refills: 0 | Status: SHIPPED | OUTPATIENT
Start: 2022-08-05 | End: 2022-08-09

## 2022-08-05 ASSESSMENT — ENCOUNTER SYMPTOMS
NECK PAIN: 1
CARDIOVASCULAR NEGATIVE: 1
RESPIRATORY NEGATIVE: 1
BACK PAIN: 1
GASTROINTESTINAL NEGATIVE: 1
NEUROLOGICAL NEGATIVE: 1

## 2022-08-05 NOTE — ED NOTES
"Patient up to triage desk inquiring about wait time. Patient informed the wait time for the main department will still be \"a while\". Patient states he would like to be seen in UC. \"I just need help\".  "

## 2022-08-05 NOTE — ED TRIAGE NOTES
Pt c/o upper back pain that started yesterday after work after doing heavy lifting, states pain is worse today.      Triage Assessment     Row Name 08/05/22 1320       Triage Assessment (Adult)    Airway WDL WDL       Respiratory WDL    Respiratory WDL WDL       Skin Circulation/Temperature WDL    Skin Circulation/Temperature WDL WDL       Cardiac WDL    Cardiac WDL WDL       Peripheral/Neurovascular WDL    Peripheral Neurovascular WDL WDL       Cognitive/Neuro/Behavioral WDL    Cognitive/Neuro/Behavioral WDL WDL

## 2022-08-05 NOTE — DISCHARGE INSTRUCTIONS
Prescribed robaxin, which is a muscle relaxant.  Muscle relaxants will make you feel drowsy.  No working, driving, or operating equipment for 8 hours from your last dose of muscle relaxants.    Apply ice to the affected area and 15 to 20-minute intervals 4 times per day for 48 hours post injury, may then consider applying heat in 15 to 20-minute intervals 4 times per day.  May use Voltaren gel 4 times per day or a lidocaine patches (apply to the low back for 12 hours per day) for additional pain control. Recommend not using heat at the same time as lidocaine patches or Voltaren gel to avoid excessive irritation.    Recommend urgent medical evaluation if you develop worsening neck or back pain, pain out of proportion to injury, numbness or tingling or loss of feeling in the neck or back or upper extremities, or worsening pain in the upper extremities.

## 2022-08-05 NOTE — ED PROVIDER NOTES
History     Chief Complaint   Patient presents with     Back Injury     HPI  Brenda Rizzo is a 22 year old male with a past medical history of supraventricular tachycardia, palpitations, anxiety and depression who presents for evaluation of cervical and thoracic back pain following an injury at work yesterday.  The patient was lifting some large boxes of siding, balancing on his head.  States that he noticed dull pain in the cervical spine at the level of C7 as well as thoracic pain at the level of T5-T7 today.  He has not been taking any over-the-counter medication for symptomatic relief, has not been applying ice to the affected area.  Pain is elicited especially with lateral bending of his neck to the right and forward flexion.  He describes the pain in the cervical spine as dull.  The pain is a bit more sharp in the thoracic spine.  He denies having numbness or tingling or weakness in the upper extremities, face, or neck.  No concerns of breathing or swallowing.  Has normal strength and sensation in the upper extremities.    Allergies:  No Known Allergies    Problem List:    Patient Active Problem List    Diagnosis Date Noted     Anxiety and depression 11/26/2018     Priority: Medium     Supraventricular tachycardia (H) 06/04/2018     Priority: Medium     Palpitations 06/04/2018     Priority: Medium     Anxiety 06/04/2018     Priority: Medium        Past Medical History:    Past Medical History:   Diagnosis Date     Unspecified part of open fracture of clavicle 10/08/2004       Past Surgical History:    Past Surgical History:   Procedure Laterality Date     SURGICAL HISTORY OF -   2002    penoscrotal web repair       Family History:    Family History   Problem Relation Age of Onset     Hypertension Maternal Grandmother      Depression Maternal Grandmother        Social History:  Marital Status:  Single [1]  Social History     Tobacco Use     Smoking status: Former Smoker     Years: 1.00     Types: Vaping  "Device     Smokeless tobacco: Never Used   Vaping Use     Vaping Use: Every day     Substances: Nicotine     Devices: Refillable tank   Substance Use Topics     Alcohol use: No     Drug use: Not Currently     Types: Marijuana     Comment: daily        Medications:    methocarbamol (ROBAXIN) 500 MG tablet  escitalopram (LEXAPRO) 10 MG tablet          Review of Systems   Respiratory: Negative.    Cardiovascular: Negative.    Gastrointestinal: Negative.    Genitourinary: Negative.    Musculoskeletal: Positive for back pain and neck pain.   Neurological: Negative.        Physical Exam   BP: 107/71  Pulse: 79  Temp: 97  F (36.1  C)  Resp: 12  Height: 188 cm (6' 2\")  Weight: 77.1 kg (170 lb)  SpO2: 97 %      Physical Exam  Constitutional:       General: He is not in acute distress.     Appearance: Normal appearance. He is not ill-appearing, toxic-appearing or diaphoretic.   Neck:      Meningeal: Brudzinski's sign and Kernig's sign absent.   Cardiovascular:      Pulses: Normal pulses.           Radial pulses are 2+ on the right side and 2+ on the left side.   Musculoskeletal:         General: No swelling.      Right hand: No swelling, deformity, tenderness or bony tenderness. Decreased range of motion. Normal strength. Normal sensation. Normal capillary refill. Normal pulse.      Cervical back: Neck supple. Spasms and tenderness present. No swelling, edema, deformity, erythema, signs of trauma, lacerations, rigidity, torticollis, bony tenderness or crepitus. No pain with movement or muscular tenderness. Decreased range of motion.      Thoracic back: Tenderness present. No swelling, edema, deformity, signs of trauma, lacerations, spasms or bony tenderness. Normal range of motion. No scoliosis.      Comments: Mild midline tenderness at C6-C7.  Has decreased range of motion with lateral bending of the neck to the right as well as forward flexion.  Has mild tenderness to palpation over spinous processes of T5 to T7.  No " rashes, no deformities, no masses, no scoliosis.  No meningeal signs, negative Brudzinski's and Kernig signs.  Mildly decreased range of motion in extension of the right thumb   Skin:     General: Skin is warm and dry.      Findings: No laceration, lesion or rash.   Neurological:      General: No focal deficit present.      Mental Status: He is alert and oriented to person, place, and time.      Cranial Nerves: No cranial nerve deficit.      Sensory: No sensory deficit.      Motor: No weakness.      Deep Tendon Reflexes: Reflexes normal.      Reflex Scores:       Bicep reflexes are 2+ on the right side and 2+ on the left side.       Brachioradialis reflexes are 2+ on the right side and 2+ on the left side.        ED Course                 Procedures                Results for orders placed or performed during the hospital encounter of 08/05/22 (from the past 24 hour(s))   Cervical spine XR, 2-3 views    Narrative    EXAM: XR CERVICAL SPINE 2/3 VIEWS  LOCATION: [facility]  DATE/TIME: 8/5/2022 7:39 PM    INDICATION: cervical spine pain at C6 to C7 after heavy lifting  COMPARISON: None.  TECHNIQUE: CR Cervical Spine.      Impression    IMPRESSION:     The odontoid process is intact.    Vertebral body heights are unremarkable.    Slight reversal of the normal cervical lordosis centered at C4-C5. Trace C3-C4 anterolisthesis.    Intervertebral disc spaces appear preserved.    Prevertebral soft tissues are unremarkable.    Visualized lung apices are well aerated.     Thoracic spine XR, 3 views    Narrative    EXAM: XR THORACIC SPINE 3 VIEWS  LOCATION: [facility]  DATE/TIME: 8/5/2022 7:40 PM    INDICATION: thoracic spine pain at T5 to T7 after heavy lifting  COMPARISON: None.  TECHNIQUE: CR Thoracic Spine.      Impression    IMPRESSION:     Vertebral body heights are unremarkable.     Alignment is unremarkable.    Intervertebral disc spaces are preserved.    Pedicles  appear symmetric.    The visualized lung fields are well aerated.        Medications - No data to display    Assessments & Plan (with Medical Decision Making)   Presentation and physical exam are consistent with neck and back pain, caused by heavy lifting and balancing heavy loads on the patient's head.  While in clinic today, he had a feeling of pins-and-needles in the radial distribution of his right hand, resolved after 1 minutes.  Has not recurred.  Given the patient has normal strength and sensation, normal capillary refill, and normal circulation in the upper extremities, I feel this is an incidental finding caused by nerve irritation, likely due to balancing heavy loads on his head and straining his neck and back.  No further imaging is indicated at this time.    No concerns for vertebral body dislocation or fracture in the cervical or thoracic spine shown on x-ray today.    Provided the patient with a workability note, recommend that he take at least 2 days off to rest.    Prescribed robaxin, which is a muscle relaxant.  Muscle relaxants will make you feel drowsy.  No working, driving, or operating equipment for 8 hours from your last dose of muscle relaxants.    Apply ice to the affected area and 15 to 20-minute intervals 4 times per day for 48 hours post injury, may then consider applying heat in 15 to 20-minute intervals 4 times per day.  May use Voltaren gel 4 times per day or a lidocaine patches (apply to the low back for 12 hours per day) for additional pain control. Recommend not using heat at the same time as lidocaine patches or Voltaren gel to avoid excessive irritation.    Provided referral to orthopedics for further evaluation and management of right hand pain from a previous injury, falling from a skateboard.  States that he has continued pain in the thenar eminence and the extensor tendons of the thumb.  Has normal strength and sensation in the right upper extremity.    Recommend urgent medical  evaluation if you develop worsening neck or back pain, pain out of proportion to injury, numbness or tingling or loss of feeling in the neck or back or upper extremities, or worsening pain in the upper extremities.     I have reviewed the nursing notes.    I have reviewed the findings, diagnosis, plan and need for follow up with the patient.      New Prescriptions    METHOCARBAMOL (ROBAXIN) 500 MG TABLET    Take 2 tablets (1,000 mg) by mouth 4 times daily for 4 days       Final diagnoses:   Right hand pain   Back pain   Neck pain       8/5/2022   [facility] EMERGENCY DEPT     Linden Zurita PA-C  08/05/22 2014

## 2022-08-17 ENCOUNTER — OFFICE VISIT (OUTPATIENT)
Dept: URGENT CARE | Facility: URGENT CARE | Age: 22
End: 2022-08-17
Payer: COMMERCIAL

## 2022-08-17 VITALS
OXYGEN SATURATION: 99 % | TEMPERATURE: 97 F | SYSTOLIC BLOOD PRESSURE: 109 MMHG | BODY MASS INDEX: 19.26 KG/M2 | RESPIRATION RATE: 16 BRPM | WEIGHT: 150 LBS | DIASTOLIC BLOOD PRESSURE: 70 MMHG

## 2022-08-17 DIAGNOSIS — R10.13 EPIGASTRIC PAIN: Primary | ICD-10-CM

## 2022-08-17 PROCEDURE — 99214 OFFICE O/P EST MOD 30 MIN: CPT | Performed by: PHYSICIAN ASSISTANT

## 2022-08-17 ASSESSMENT — ENCOUNTER SYMPTOMS
PALPITATIONS: 0
DIARRHEA: 0
ROS GI COMMENTS: STOMACH PAIN
NEUROLOGICAL NEGATIVE: 1
CONSTITUTIONAL NEGATIVE: 1
CARDIOVASCULAR NEGATIVE: 1
COUGH: 0
FREQUENCY: 0
VOMITING: 0
MYALGIAS: 0
HEMATURIA: 0
HEADACHES: 0
SORE THROAT: 0
FEVER: 0
GASTROINTESTINAL NEGATIVE: 1
CHILLS: 0
RESPIRATORY NEGATIVE: 1
CHEST TIGHTNESS: 0
ALLERGIC/IMMUNOLOGIC NEGATIVE: 1
ABDOMINAL PAIN: 0
SHORTNESS OF BREATH: 0
NAUSEA: 0
DYSURIA: 0
MUSCULOSKELETAL NEGATIVE: 1
WHEEZING: 0

## 2022-08-17 ASSESSMENT — PAIN SCALES - GENERAL: PAINLEVEL: NO PAIN (0)

## 2022-08-17 NOTE — PROGRESS NOTES
Chief Complaint:    Chief Complaint   Patient presents with     Abdominal Pain     Has had abdominal pain in the morning and it feels like fire in his stomach. Pt states he poops once a day and has not been regularly taking his lexapro. He was 170lbs 12 days ago.       Medical Decision Making:    Vital signs reviewed by Jamin Michelle PA-C  /70   Temp 97  F (36.1  C) (Tympanic)   Resp 16   Wt 68 kg (150 lb)   SpO2 99%   BMI 19.26 kg/m      Differential Diagnosis:  Ulcer, H. Pylori, gastritis, PND     ASSESSMENT:     1. Epigastric pain         PLAN:     Patient is in no acute distress.  He is afebrile with stable vital signs.  Abdominal exam was benign.  Unclear etiology at this time.  Wll get H. Pylori today.    Rx for Omeprazole.  Patient understands he is not to start this until stool sample has been submitted.    Patient instructed to follow up with PCP in the next week for reevaluation and lab results.    Worrisome symptoms discussed with instructions to go to the ED.  Patient verbalized understanding and agreed with this plan.    Labs:     No results found for any visits on 08/17/22.    Current Meds:    Current Outpatient Medications:      escitalopram (LEXAPRO) 10 MG tablet, Take 1 tablet (10 mg) by mouth daily, Disp: 90 tablet, Rfl: 3     omeprazole (PRILOSEC) 20 MG DR capsule, Take 1 capsule (20 mg) by mouth daily, Disp: 30 capsule, Rfl: 0    Allergies:  No Known Allergies    SUBJECTIVE    HPI: Brenda Rizzo is an 22 year old male who presents for evaluation and treatment of stomach pain.  Patient states that he has a burning sensation in the stomach that started roughly 1 month ago.  He has not tried anything for the symptoms.  Nothing makes the symptoms better or worse.  He denies any fever, chills, nausea or vomiting.  No diarrhea, or black tarry stools.      ROS:      Review of Systems   Constitutional: Negative.  Negative for chills and fever.   HENT: Negative.  Negative for sore throat.     Respiratory: Negative.  Negative for cough, chest tightness, shortness of breath and wheezing.    Cardiovascular: Negative.  Negative for chest pain and palpitations.   Gastrointestinal: Negative.  Negative for abdominal pain, diarrhea, nausea and vomiting.        Stomach Pain     Genitourinary: Negative for dysuria, frequency, hematuria and urgency.   Musculoskeletal: Negative.  Negative for myalgias.   Skin: Negative for rash.   Allergic/Immunologic: Negative.  Negative for immunocompromised state.   Neurological: Negative.  Negative for headaches.        Family History   Family History   Problem Relation Age of Onset     Hypertension Maternal Grandmother      Depression Maternal Grandmother        Social History  Social History     Socioeconomic History     Marital status: Single     Spouse name: Not on file     Number of children: Not on file     Years of education: Not on file     Highest education level: Not on file   Occupational History     Not on file   Tobacco Use     Smoking status: Former Smoker     Years: 1.00     Types: Vaping Device     Smokeless tobacco: Never Used   Vaping Use     Vaping Use: Every day     Substances: Nicotine     Devices: Refillable tank   Substance and Sexual Activity     Alcohol use: No     Drug use: Not Currently     Types: Marijuana     Comment: daily     Sexual activity: Not on file   Other Topics Concern     Not on file   Social History Narrative     Not on file     Social Determinants of Health     Financial Resource Strain: Not on file   Food Insecurity: Not on file   Transportation Needs: Not on file   Physical Activity: Not on file   Stress: Not on file   Social Connections: Not on file   Intimate Partner Violence: Not on file   Housing Stability: Not on file        Surgical History:  Past Surgical History:   Procedure Laterality Date     SURGICAL HISTORY OF -   2002    penoscrotal web repair        Problem List:  Patient Active Problem List   Diagnosis      Supraventricular tachycardia (H)     Palpitations     Anxiety     Anxiety and depression           OBJECTIVE:     Vital signs noted and reviewed by Jamin Michelle PA-C  /70   Temp 97  F (36.1  C) (Tympanic)   Resp 16   Wt 68 kg (150 lb)   SpO2 99%   BMI 19.26 kg/m       PEFR:    Physical Exam  Vitals and nursing note reviewed.   Constitutional:       General: He is not in acute distress.     Appearance: He is well-developed. He is not ill-appearing, toxic-appearing or diaphoretic.   HENT:      Head: Normocephalic and atraumatic.      Right Ear: Hearing, tympanic membrane, ear canal and external ear normal. Tympanic membrane is not perforated, erythematous, retracted or bulging.      Left Ear: Hearing, tympanic membrane, ear canal and external ear normal. Tympanic membrane is not perforated, erythematous, retracted or bulging.      Nose: Nose normal. No mucosal edema, congestion or rhinorrhea.      Mouth/Throat:      Pharynx: No oropharyngeal exudate or posterior oropharyngeal erythema.      Tonsils: No tonsillar exudate or tonsillar abscesses. 0 on the right. 0 on the left.   Eyes:      Pupils: Pupils are equal, round, and reactive to light.   Cardiovascular:      Rate and Rhythm: Normal rate and regular rhythm.      Heart sounds: Normal heart sounds, S1 normal and S2 normal. Heart sounds not distant. No murmur heard.    No friction rub. No gallop.   Pulmonary:      Effort: Pulmonary effort is normal. No respiratory distress.      Breath sounds: Normal breath sounds. No decreased breath sounds, wheezing, rhonchi or rales.   Abdominal:      General: Bowel sounds are normal. There is no distension.      Palpations: Abdomen is soft.      Tenderness: There is no abdominal tenderness. There is no right CVA tenderness, left CVA tenderness or guarding. Negative signs include Orozco's sign and McBurney's sign.   Musculoskeletal:      Cervical back: Normal range of motion and neck supple.   Lymphadenopathy:       Cervical: No cervical adenopathy.   Skin:     General: Skin is warm and dry.      Findings: No rash.   Neurological:      Mental Status: He is alert.      Cranial Nerves: No cranial nerve deficit.   Psychiatric:         Attention and Perception: He is attentive.         Speech: Speech normal.         Behavior: Behavior normal. Behavior is cooperative.         Thought Content: Thought content normal.         Judgment: Judgment normal.             Jamin Michelle PA-C  8/17/2022, 1:55 PM

## 2022-08-18 PROCEDURE — 87338 HPYLORI STOOL AG IA: CPT | Performed by: PHYSICIAN ASSISTANT

## 2022-08-19 LAB — H PYLORI AG STL QL IA: NEGATIVE

## 2022-08-19 NOTE — RESULT ENCOUNTER NOTE
Final report for H Pylori antigen is NEGATIVE.  No treatment per [facility] ED Lab Result H Pylor Protocol.

## 2022-08-23 ENCOUNTER — OFFICE VISIT (OUTPATIENT)
Dept: FAMILY MEDICINE | Facility: CLINIC | Age: 22
End: 2022-08-23
Payer: COMMERCIAL

## 2022-08-23 VITALS
DIASTOLIC BLOOD PRESSURE: 64 MMHG | TEMPERATURE: 97.4 F | WEIGHT: 156.2 LBS | HEART RATE: 58 BPM | BODY MASS INDEX: 20.05 KG/M2 | HEIGHT: 74 IN | SYSTOLIC BLOOD PRESSURE: 96 MMHG | RESPIRATION RATE: 16 BRPM | OXYGEN SATURATION: 97 %

## 2022-08-23 DIAGNOSIS — I47.10 SUPRAVENTRICULAR TACHYCARDIA (H): ICD-10-CM

## 2022-08-23 DIAGNOSIS — I45.10 INCOMPLETE RBBB: ICD-10-CM

## 2022-08-23 DIAGNOSIS — F32.A ANXIETY AND DEPRESSION: ICD-10-CM

## 2022-08-23 DIAGNOSIS — F41.9 ANXIETY AND DEPRESSION: ICD-10-CM

## 2022-08-23 DIAGNOSIS — K29.70 GASTRITIS WITHOUT BLEEDING, UNSPECIFIED CHRONICITY, UNSPECIFIED GASTRITIS TYPE: Primary | ICD-10-CM

## 2022-08-23 PROCEDURE — 99214 OFFICE O/P EST MOD 30 MIN: CPT | Performed by: FAMILY MEDICINE

## 2022-08-23 ASSESSMENT — PATIENT HEALTH QUESTIONNAIRE - PHQ9
10. IF YOU CHECKED OFF ANY PROBLEMS, HOW DIFFICULT HAVE THESE PROBLEMS MADE IT FOR YOU TO DO YOUR WORK, TAKE CARE OF THINGS AT HOME, OR GET ALONG WITH OTHER PEOPLE: VERY DIFFICULT
SUM OF ALL RESPONSES TO PHQ QUESTIONS 1-9: 15
SUM OF ALL RESPONSES TO PHQ QUESTIONS 1-9: 15

## 2022-08-23 ASSESSMENT — PAIN SCALES - GENERAL: PAINLEVEL: NO PAIN (0)

## 2022-08-23 NOTE — PATIENT INSTRUCTIONS
Ppick up and start omeprazole as prescribed.  Avoid alcohol for a few weeks, and if you restart drinking alcohol, limit to not more than 2 drinks per day, and not more than 8 drinks per week.  Avoid ibuprofen or naproxen for now.    Update care team on how you are doing with the abdominal pain after 3-4 weeks.  Start an Evisit if you still have abdominal symptoms.    Referrals to cardiology and mental health services have been signed. Schedulers will call you in the next 3-5 business days.     Read more information about your conditions in the handouts attached to this visit summary.

## 2022-08-23 NOTE — PROGRESS NOTES
Assessment & Plan     Gastritis without bleeding, unspecified chronicity, unspecified gastritis type  No acute abdomen today.  Advised patient on causes, course, and  treatment of condition.  Advised other possible symptoms that could be associated with it.  He has not started PPI so he was advised to  his Rx - he said he will.  Dietary, lifestyle and habit advice given to help decrease gastric acid production and promote healing.  Return precautions discussed and given to patient.    Anxiety and depression  Patient's symptoms of concern as mentioned in HPI appear to be more anxiety related than ADD related.  With his hx of abnormal cardiac tracings, will defer use of any stimulants until reevaluated by psychiatry and cardiology.  Patient agrees to plan.  Will keep escitalopram as is for now.  - Adult Mental Health  Referral    Supraventricular tachycardia (H)  Reviewed with patient his previous EKG tracing, tahiro mitul result and peds cardiology consult notes.  He was lost to follow up after the visit in June 2018.  Due to the concern of possible occult cardiomyopathy vs chronic dysrhythmias, reestablish care with adult cardiology.  Patient concurs.  Return precautions discussed and given to patient.   - Adult Cardiology Eval  Referral    Incomplete RBBB  Asymptomatic. Patient denies recent palpitations nor syncope.  See above for plan.  - Adult Cardiology Eval  Referral       Nicotine/Tobacco Cessation:  He reports that he has been smoking vaping device. He has smoked for the past 1.00 year. He has never used smokeless tobacco.  Nicotine/Tobacco Cessation Plan:   Information offered: Patient not interested at this time      Depression Screening Follow Up    PHQ 8/23/2022   PHQ-9 Total Score 15   Q9: Thoughts of better off dead/self-harm past 2 weeks Not at all     Last PHQ-9 8/23/2022   1.  Little interest or pleasure in doing things 3   2.  Feeling down, depressed, or hopeless 1    3.  Trouble falling or staying asleep, or sleeping too much 3   4.  Feeling tired or having little energy 2   5.  Poor appetite or overeating 1   6.  Feeling bad about yourself 1   7.  Trouble concentrating 3   8.  Moving slowly or restless 1   Q9: Thoughts of better off dead/self-harm past 2 weeks 0   PHQ-9 Total Score 15   Difficulty at work, home, or with people -       Follow Up Actions Taken  Mental Health Referral placed     Patient Instructions   Ppick up and start omeprazole as prescribed.  Avoid alcohol for a few weeks, and if you restart drinking alcohol, limit to not more than 2 drinks per day, and not more than 8 drinks per week.  Avoid ibuprofen or naproxen for now.    Update care team on how you are doing with the abdominal pain after 3-4 weeks.  Start an Evisit if you still have abdominal symptoms.    Referrals to cardiology and mental health services have been signed. Schedulers will call you in the next 3-5 business days.     Read more information about your conditions in the handouts attached to this visit summary.       Return in about 1 month (around 9/23/2022) for In-clinic visit for if with new symptoms..    Dallas Leroy MD  [facility]    Pepito Gutierrez is a 22 year old, presenting for the following health issues:  ER F/U (Pt being seen for post urgent care follow up.) and Medication Change (Pt would like to discuss possibly changing lexapro to adderall.  He used to take this in the past but was taken off due to heart palpitations.)      History of Present Illness       Reason for visit:  Follow up for stomach    He eats 0-1 servings of fruits and vegetables daily.He consumes 2 sweetened beverage(s) daily.He exercises with enough effort to increase his heart rate 30 to 60 minutes per day.  He exercises with enough effort to increase his heart rate 6 days per week. He is missing 2 dose(s) of medications per week.    Today's PHQ-9         PHQ-9 Total Score:  "15    PHQ-9 Q9 Thoughts of better off dead/self-harm past 2 weeks :   Not at all    How difficult have these problems made it for you to do your work, take care of things at home, or get along with other people: Very difficult       ED/UC Followup:    Facility:  [facility]  Date of visit: 8/17/22  Reason for visit: abdominal pain  Current Status: Pt feels a little better but still having some pain intermittantly.      When pain occurs, may feel nauseous but has never vomited.  Has no picked up omeprazole Rx.    Patient denies making significant changes to his eating habits except for drinking more water.  Consumes alcohol - not everyday - about 2 x a week, 3-4 beer per episode.  Denies frequent NSAID use.  Denies current consumption of caffeinated products.     Medication Followup of escitalopram    Taking Medication as prescribed: yes    Side Effects:  None    Medication Helping Symptoms:  yes    Patient was diagnosed with ADHD in 2018 by [facility] provider.  Patient was taken off adderall that time due to heart palpitations.  Found to have SVT on zio patch monitor in 2018.  EKG also showed incomplete RBBB.  Symptoms of concern: fidgety with hands (picking on hair on face/body, picking on fingernails).    Review of Systems   Constitutional, HEENT, cardiovascular, pulmonary, GI, , musculoskeletal, neuro, skin, endocrine and psych systems are negative, except as otherwise noted.      Objective    BP 96/64   Pulse 58   Temp 97.4  F (36.3  C) (Tympanic)   Resp 16   Ht 1.88 m (6' 2\")   Wt 70.9 kg (156 lb 3.2 oz)   SpO2 97%   BMI 20.05 kg/m    Body mass index is 20.05 kg/m .  Physical Exam   GENERAL: alert and no distress  EYES: no icterus, PERRLA  NECK: no tenderness, no adenopathy,  Thyroid not enlarged  RESP: lungs clear to auscultation - no rales, no rhonchi, no wheezes  CV: regular rates and rhythm, no murmur  MS: no edema  SKIN: no jaundice or rash  NEURO: no tremors  PSYCH: well-kempt,  linear " thought process, normal speech, good insight/judgement, very slightly and intermittently fidgety with hands, normal mood, appropriate affect, no suicidality, no aggression, no hallucination  ABD: flat, mild LUQ TTP but no palpable organomegaly or mass    No results found for any visits on 08/23/22.            .  ..

## 2022-08-23 NOTE — LETTER
[facility]  [address] [address]  [phone]          August 23, 2022    RE:  Brenda HARVEY Matthias                                                                                                                                                       [address] [address]            To whom it may concern:    Brenda has been seen and evaluated in the clinic today, August 23, 2022.      Sincerely,        Dallas Leroy MD

## 2022-08-31 ENCOUNTER — HOSPITAL ENCOUNTER (EMERGENCY)
Facility: CLINIC | Age: 22
Discharge: HOME | End: 2022-08-31
Attending: PHYSICIAN ASSISTANT | Admitting: PHYSICIAN ASSISTANT
Payer: COMMERCIAL

## 2022-08-31 VITALS
OXYGEN SATURATION: 98 % | TEMPERATURE: 96.7 F | RESPIRATION RATE: 18 BRPM | DIASTOLIC BLOOD PRESSURE: 53 MMHG | HEART RATE: 63 BPM | SYSTOLIC BLOOD PRESSURE: 102 MMHG

## 2022-08-31 DIAGNOSIS — F41.9 ANXIETY: ICD-10-CM

## 2022-08-31 PROCEDURE — G0463 HOSPITAL OUTPT CLINIC VISIT: HCPCS | Performed by: PHYSICIAN ASSISTANT

## 2022-08-31 PROCEDURE — 99213 OFFICE O/P EST LOW 20 MIN: CPT | Performed by: PHYSICIAN ASSISTANT

## 2022-08-31 RX ORDER — HYDROXYZINE HYDROCHLORIDE 25 MG/1
25 TABLET, FILM COATED ORAL 3 TIMES DAILY PRN
Qty: 30 TABLET | Refills: 0 | Status: SHIPPED | OUTPATIENT
Start: 2022-08-31 | End: 2023-01-09

## 2022-08-31 NOTE — ED PROVIDER NOTES
History     Chief Complaint   Patient presents with     Generalized Weakness     HPI  Brenda Rizzo is a 22 year old male with a past medical history of anxiety and depression who presents for evaluation of weakness, shakiness and shortness of breath which began earlier today.  He has been taking Lexapro 10 mg long-term for symptomatic relief of anxiety and depression, took a break of 1 week from the medication.  Then restarted his Lexapro over the past few days.  States that he has been more stressed out at work recently due to having a new manager.  He initially noticed some shakiness/jitteriness after starting his workday at [employer] this morning.  He has had some shortness of breath at rest and with activity as well.  He continues to feel shaky and jittery, especially in his upper arms and shoulders.  Had a twinge of left-sided chest pain that lasted only seconds earlier today and then resolved.  He has had a history of occasional palpitations, has an appointment to follow-up with cardiology for further evaluation and management.  The patient has not taken any medications for relief of current symptoms.  No current chest pain, no URI symptoms, no difficulties with breathing or swallowing, no abdominal pain, nausea or vomiting.  Normal bowel bladder function    Allergies:  No Known Allergies    Problem List:    Patient Active Problem List    Diagnosis Date Noted     Anxiety and depression 11/26/2018     Priority: Medium     Supraventricular tachycardia (H) 06/04/2018     Priority: Medium     Palpitations 06/04/2018     Priority: Medium     Anxiety 06/04/2018     Priority: Medium        Past Medical History:    Past Medical History:   Diagnosis Date     Unspecified part of open fracture of clavicle 10/08/2004       Past Surgical History:    Past Surgical History:   Procedure Laterality Date     SURGICAL HISTORY OF -   2002    penoscrotal web repair       Family History:    Family History   Problem Relation Age of  Onset     Hypertension Maternal Grandmother      Depression Maternal Grandmother        Social History:  Marital Status:  Single [1]  Social History     Tobacco Use     Smoking status: Current Every Day Smoker     Years: 1.00     Types: Vaping Device     Smokeless tobacco: Never Used   Vaping Use     Vaping Use: Every day     Substances: Nicotine     Devices: Refillable tank   Substance Use Topics     Alcohol use: Yes     Comment: social     Drug use: Not Currently     Types: Marijuana     Comment: daily        Medications:    escitalopram (LEXAPRO) 10 MG tablet  hydrOXYzine (ATARAX) 25 MG tablet  omeprazole (PRILOSEC) 20 MG DR capsule          Review of Systems   Constitutional: Negative.    HENT: Negative.    Respiratory: Positive for shortness of breath.    Musculoskeletal: Negative.    Neurological: Positive for weakness.   Psychiatric/Behavioral: Negative for agitation, behavioral problems, confusion, decreased concentration, dysphoric mood, hallucinations, self-injury, sleep disturbance and suicidal ideas. The patient is nervous/anxious. The patient is not hyperactive.        Physical Exam   BP: 102/53  Pulse: 63  Temp: (!) 96.7  F (35.9  C)  Resp: 18  SpO2: 98 %      Physical Exam  Constitutional:       General: He is in acute distress.      Appearance: Normal appearance. He is not ill-appearing, toxic-appearing or diaphoretic.   HENT:      Head: Normocephalic and atraumatic.   Cardiovascular:      Rate and Rhythm: Normal rate and regular rhythm.      Pulses: Normal pulses.      Heart sounds: Normal heart sounds. No murmur heard.  Pulmonary:      Effort: Pulmonary effort is normal. No respiratory distress.      Breath sounds: Normal breath sounds. No stridor. No wheezing, rhonchi or rales.   Chest:      Chest wall: No tenderness.   Musculoskeletal:         General: No swelling or tenderness. Normal range of motion.      Cervical back: Normal range of motion and neck supple. No rigidity. No muscular  tenderness.   Lymphadenopathy:      Cervical: No cervical adenopathy.   Skin:     General: Skin is warm and dry.   Neurological:      Mental Status: He is alert and oriented to person, place, and time.      Sensory: No sensory deficit.      Motor: No weakness.         ED Course                 Procedures                No results found for this or any previous visit (from the past 24 hour(s)).    Medications - No data to display    Assessments & Plan (with Medical Decision Making)     The patient is a 22-year-old male with a history of anxiety and depression who presents with concerns for shakiness, generalized weakness and shortness of breath which began earlier today.  States that he feels his symptoms are due to taking a 1 week break from Lexapro and then restarting.  Feels more anxious recently.  He denies having any chest pains currently, had a twinge of chest pain earlier today which resolved after seconds.  Feels jittery and short of breath at rest.    Appears anxious on exam today.  No palpitations currently.  However he has had history of palpitations and currently has an appointment to follow with cardiology for further evaluation and management.    Prescribed Atarax for the patient.  Discussed giving a dose of Atarax here at the clinic, also discussed Ativan.  The patient does not wish to take any medications in clinic, does not want Ativan.  Would prefer to go home and try Atarax for symptomatic relief.  I agree that his current symptoms are likely due to heightened anxiety/anxiety attack.  I have low concern for a cardiac concern today considering the patient has no ongoing chest pain, had short lived sensation of left-sided chest pain earlier which resolved after few seconds.  Edac score is 8, low risk for cardiac concern today. I offered further work-up in the emergency department for shortness of breath and chest discomfort, however the patient declined at this time.  I feel he is taking a break from  Lexapro and restarting may have contributed to his current symptoms today.    No working, driving, or operating equipment for 8 hours after taking her last dose of Atarax.  Follow-up with primary care within 1 week for further evaluation and management.    Return precautions discussed.        I have reviewed the nursing notes.    I have reviewed the findings, diagnosis, plan and need for follow up with the patient.      Discharge Medication List as of 8/31/2022  4:45 PM      START taking these medications    Details   hydrOXYzine (ATARAX) 25 MG tablet Take 1 tablet (25 mg) by mouth 3 times daily as needed for itching, Disp-30 tablet, R-0, E-Prescribe             Final diagnoses:   Anxiety       8/31/2022   [facility] EMERGENCY DEPT     Linden Zurita PA-C  09/01/22 [id number]

## 2022-08-31 NOTE — Clinical Note
Brenda Rizzo was seen and treated in our emergency department on 8/31/2022.  He may return to work on 09/02/2022.  Medical reasons, I am requiring Brenda to be out of work until September 2, 2022.     If you have any questions or concerns, please don't hesitate to call.      Linden Zurita PA-C

## 2022-08-31 NOTE — ED TRIAGE NOTES
Pt arrives with report of feeling shaky, weakness in limbs. Feels short of breath at rest. Having anxiety about heart condition. Will comply with F/U for cardiac condition. Appt is 9/15/2022  Also restarted Lexapro on Monday after being being off medication for a week. VSS. Pt smokes an e cigarette 10 to 15 x per day.     Triage Assessment     Row Name 08/31/22 1542       Triage Assessment (Adult)    Airway WDL WDL       Respiratory WDL    Respiratory WDL WDL

## 2022-08-31 NOTE — DISCHARGE INSTRUCTIONS
No working, driving, or operating equipment for 8 hours after taking her last dose of Atarax.  Follow-up with primary care within 1 week for further evaluation and management.

## 2022-08-31 NOTE — Clinical Note
Brenda Rizzo was seen and treated in our emergency department on 8/31/2022.         Sincerely,     [facility] Emergency Dept

## 2022-09-01 ASSESSMENT — ENCOUNTER SYMPTOMS
CONFUSION: 0
DECREASED CONCENTRATION: 0
WEAKNESS: 1
SHORTNESS OF BREATH: 1
HYPERACTIVE: 0
SLEEP DISTURBANCE: 0
HALLUCINATIONS: 0
CONSTITUTIONAL NEGATIVE: 1
DYSPHORIC MOOD: 0
MUSCULOSKELETAL NEGATIVE: 1
AGITATION: 0
NERVOUS/ANXIOUS: 1

## 2022-09-13 ENCOUNTER — TELEPHONE (OUTPATIENT)
Dept: BEHAVIORAL HEALTH | Facility: CLINIC | Age: 22
End: 2022-09-13

## 2022-09-13 NOTE — TELEPHONE ENCOUNTER
[facility]  No data recorded      Social Work Care Navigator Note      Patient: Brenda Rizzo  Date: September 13, 2022  Preferred Name: Brenda    Previous PHQ-9:   PHQ-9 SCORE 12/27/2021 3/16/2022 8/23/2022   PHQ-9 Total Score MyChart - - 15 (Moderately severe depression)   PHQ-9 Total Score 12 9 15     Previous PETROS-7:   PETROS-7 SCORE 10/11/2021 12/27/2021 3/16/2022   Total Score 16 10 6     JENNIFER LEVEL:  No flowsheet data found.    Preferred Contact:  No data recorded    Type of Contact Today: Phone call (not reached/unavailable)      Data: (subjective / Objective):  Attempted to reach patient to introduce [facility] program,  but was unsuccessful.  Left message, sent MyChart message and sent message to PCP.  Plan to attempt again.     Ilsa Mcdonald [facility]  [facility]   [facility]  [phone]

## 2022-09-20 ENCOUNTER — OFFICE VISIT (OUTPATIENT)
Dept: CARDIOLOGY | Facility: CLINIC | Age: 22
End: 2022-09-20
Attending: FAMILY MEDICINE
Payer: COMMERCIAL

## 2022-09-20 VITALS
DIASTOLIC BLOOD PRESSURE: 57 MMHG | SYSTOLIC BLOOD PRESSURE: 106 MMHG | HEART RATE: 56 BPM | HEIGHT: 74 IN | BODY MASS INDEX: 20.02 KG/M2 | RESPIRATION RATE: 16 BRPM | WEIGHT: 156 LBS

## 2022-09-20 DIAGNOSIS — I47.10 SUPRAVENTRICULAR TACHYCARDIA (H): ICD-10-CM

## 2022-09-20 DIAGNOSIS — I45.10 INCOMPLETE RBBB: ICD-10-CM

## 2022-09-20 PROCEDURE — 99204 OFFICE O/P NEW MOD 45 MIN: CPT | Performed by: INTERNAL MEDICINE

## 2022-09-20 NOTE — LETTER
"9/20/2022    Dallas Leroy MD  [address] [facility] [address]    RE: Brenda Rizzo       Dear Colleague,     I had the pleasure of seeing Brenda Rizzo in the [facility].    HEART CARE ENCOUNTER CONSULTATON NOTE      [facility]  [phone]      Assessment/Recommendations   Assessment:  1.  History of SVT: 2017 underwent event monitor which demonstrated 3 runs of SVT longest was 2-1/2 minutes at 260 bpm.  Symptoms occurring now 3-4 times a day and lasting a short duration however very frequent.  2.  History of anxiety/depression    Plan:  1.  7-day event monitor  2.  Echocardiogram to evaluate for structural heart disease  3.  Referral to EP to discuss ablation       History of Present Illness/Subjective    HPI: Brenda Rizzo is a 22 year old male with history of anxiety/depression and paroxysmal SVT who I am seeing today for initial consultation.  In 2017 he wore a Zio patch which demonstrated a few episodes of SVT with rates up to 216 and longest was 2 minutes and 30 seconds.  Ablation was discussed at that time however he deferred.  As he has gotten older he has noticed more issues with his arrhythmia.  It occurs mainly when he is working.  At times he feels his heart beating very fast and added \"heart rhythm\".  If he crouches down or puts his knees to his chest he starts feeling better and his heart rate normalizes.  It occurs 3-4 times a day and usually lasts about 30 seconds to a minute.  He uses an e-cigarette and has 1 energy drink a day.  The rhythm disturbance is affecting his daily activities.  He denies any exertional complaints.       Physical Examination  Review of Systems   Vitals: /57 (BP Location: Right arm, Patient Position: Sitting, Cuff Size: Adult Regular)   Pulse 56   Resp 16   Ht 1.88 m (6' 2\")   Wt 70.8 kg (156 lb)   BMI 20.03 kg/m    BMI= Body mass index is 20.03 kg/m .  Wt Readings from Last 3 Encounters:   09/20/22 " 70.8 kg (156 lb)   08/23/22 70.9 kg (156 lb 3.2 oz)   08/17/22 68 kg (150 lb)       General Appearance:   no distress, normal body habitus   ENT/Mouth: membranes moist, no oral lesions or bleeding gums.      EYES:  no scleral icterus, normal conjunctivae   Neck: no carotid bruits or thyromegaly   Chest/Lungs:   lungs are clear to auscultation   Cardiovascular:   Regular. Normal first and second heart sounds with no murmurs  no edema bilaterally    Abdomen:  no organomegaly, masses, bruits, or tenderness; bowel sounds are present   Extremities: no cyanosis or clubbing   Skin: no xanthelasma, warm.    Neurologic: normal  bilateral, no tremors     Psychiatric: alert and oriented x3, calm        Please refer above for cardiac ROS details.        Medical History  Surgical History Family History Social History   Past Medical History:   Diagnosis Date     Unspecified part of open fracture of clavicle 10/08/2004     Past Surgical History:   Procedure Laterality Date     SURGICAL HISTORY OF -   2002    penoscrotal web repair     Family History   Problem Relation Age of Onset     Hypertension Maternal Grandmother      Depression Maternal Grandmother         Social History     Socioeconomic History     Marital status: Single     Spouse name: Not on file     Number of children: Not on file     Years of education: Not on file     Highest education level: Not on file   Occupational History     Not on file   Tobacco Use     Smoking status: Current Every Day Smoker     Years: 1.00     Types: Vaping Device     Smokeless tobacco: Never Used   Vaping Use     Vaping Use: Every day     Substances: Nicotine     Devices: Refillable tank   Substance and Sexual Activity     Alcohol use: Yes     Comment: social     Drug use: Not Currently     Types: Marijuana     Comment: daily     Sexual activity: Yes   Other Topics Concern     Not on file   Social History Narrative     Not on file     Social Determinants of Health     Financial  Resource Strain: Not on file   Food Insecurity: Not on file   Transportation Needs: Not on file   Physical Activity: Not on file   Stress: Not on file   Social Connections: Not on file   Intimate Partner Violence: Not on file   Housing Stability: Not on file           Medications  Allergies   Current Outpatient Medications   Medication Sig Dispense Refill     escitalopram (LEXAPRO) 10 MG tablet Take 1 tablet (10 mg) by mouth daily 90 tablet 3     hydrOXYzine (ATARAX) 25 MG tablet Take 1 tablet (25 mg) by mouth 3 times daily as needed for itching 30 tablet 0     omeprazole (PRILOSEC) 20 MG DR capsule Take 1 capsule (20 mg) by mouth daily 30 capsule 0     No Known Allergies       Lab Results    Chemistry/lipid CBC Cardiac Enzymes/BNP/TSH/INR   No results for input(s): CHOL, HDL, LDL, TRIG, CHOLHDLRATIO in the last 61410 hours.  No results for input(s): LDL in the last 45509 hours.  Recent Labs   Lab Test 09/15/21  1444      POTASSIUM 4.0   CHLORIDE 110*   CO2 27   GLC 90   BUN 7   CR 0.89   GFRESTIMATED >90   IVETH 9.0     Recent Labs   Lab Test 09/15/21  1444   CR 0.89     No results for input(s): A1C in the last 16575 hours.       Recent Labs   Lab Test 09/15/21  1444   WBC 5.7   HGB 15.2   HCT 44.7   MCV 86        Recent Labs   Lab Test 09/15/21  1444   HGB 15.2    No results for input(s): TROPONINI in the last 34822 hours.  No results for input(s): BNP, NTBNPI, NTBNP in the last 45273 hours.  No results for input(s): TSH in the last 63798 hours.  No results for input(s): INR in the last 36842 hours.     Estelle Quiros MD      Thank you for allowing me to participate in the care of your patient.      Sincerely,     Estelle Quiros MD     [facility]  cc:   Dallas Leroy MD  [address] [place] [address]

## 2022-09-20 NOTE — PROGRESS NOTES
"  HEART CARE ENCOUNTER CONSULTATON NOTE      [facility]  [phone]      Assessment/Recommendations   Assessment:  1.  History of SVT: 2017 underwent event monitor which demonstrated 3 runs of SVT longest was 2-1/2 minutes at 260 bpm.  Symptoms occurring now 3-4 times a day and lasting a short duration however very frequent.  2.  History of anxiety/depression    Plan:  1.  7-day event monitor  2.  Echocardiogram to evaluate for structural heart disease  3.  Referral to EP to discuss ablation       History of Present Illness/Subjective    HPI: rBenda Rizzo is a 22 year old male with history of anxiety/depression and paroxysmal SVT who I am seeing today for initial consultation.  In 2017 he wore a Zio patch which demonstrated a few episodes of SVT with rates up to 216 and longest was 2 minutes and 30 seconds.  Ablation was discussed at that time however he deferred.  As he has gotten older he has noticed more issues with his arrhythmia.  It occurs mainly when he is working.  At times he feels his heart beating very fast and added \"heart rhythm\".  If he crouches down or puts his knees to his chest he starts feeling better and his heart rate normalizes.  It occurs 3-4 times a day and usually lasts about 30 seconds to a minute.  He uses an e-cigarette and has 1 energy drink a day.  The rhythm disturbance is affecting his daily activities.  He denies any exertional complaints.       Physical Examination  Review of Systems   Vitals: /57 (BP Location: Right arm, Patient Position: Sitting, Cuff Size: Adult Regular)   Pulse 56   Resp 16   Ht 1.88 m (6' 2\")   Wt 70.8 kg (156 lb)   BMI 20.03 kg/m    BMI= Body mass index is 20.03 kg/m .  Wt Readings from Last 3 Encounters:   09/20/22 70.8 kg (156 lb)   08/23/22 70.9 kg (156 lb 3.2 oz)   08/17/22 68 kg (150 lb)       General Appearance:   no distress, normal body habitus   ENT/Mouth: membranes moist, no oral lesions or bleeding gums.      EYES:  " no scleral icterus, normal conjunctivae   Neck: no carotid bruits or thyromegaly   Chest/Lungs:   lungs are clear to auscultation   Cardiovascular:   Regular. Normal first and second heart sounds with no murmurs  no edema bilaterally    Abdomen:  no organomegaly, masses, bruits, or tenderness; bowel sounds are present   Extremities: no cyanosis or clubbing   Skin: no xanthelasma, warm.    Neurologic: normal  bilateral, no tremors     Psychiatric: alert and oriented x3, calm        Please refer above for cardiac ROS details.        Medical History  Surgical History Family History Social History   Past Medical History:   Diagnosis Date     Unspecified part of open fracture of clavicle 10/08/2004     Past Surgical History:   Procedure Laterality Date     SURGICAL HISTORY OF -   2002    penoscrotal web repair     Family History   Problem Relation Age of Onset     Hypertension Maternal Grandmother      Depression Maternal Grandmother         Social History     Socioeconomic History     Marital status: Single     Spouse name: Not on file     Number of children: Not on file     Years of education: Not on file     Highest education level: Not on file   Occupational History     Not on file   Tobacco Use     Smoking status: Current Every Day Smoker     Years: 1.00     Types: Vaping Device     Smokeless tobacco: Never Used   Vaping Use     Vaping Use: Every day     Substances: Nicotine     Devices: Refillable tank   Substance and Sexual Activity     Alcohol use: Yes     Comment: social     Drug use: Not Currently     Types: Marijuana     Comment: daily     Sexual activity: Yes   Other Topics Concern     Not on file   Social History Narrative     Not on file     Social Determinants of Health     Financial Resource Strain: Not on file   Food Insecurity: Not on file   Transportation Needs: Not on file   Physical Activity: Not on file   Stress: Not on file   Social Connections: Not on file   Intimate Partner Violence: Not on  file   Housing Stability: Not on file           Medications  Allergies   Current Outpatient Medications   Medication Sig Dispense Refill     escitalopram (LEXAPRO) 10 MG tablet Take 1 tablet (10 mg) by mouth daily 90 tablet 3     hydrOXYzine (ATARAX) 25 MG tablet Take 1 tablet (25 mg) by mouth 3 times daily as needed for itching 30 tablet 0     omeprazole (PRILOSEC) 20 MG DR capsule Take 1 capsule (20 mg) by mouth daily 30 capsule 0     No Known Allergies       Lab Results    Chemistry/lipid CBC Cardiac Enzymes/BNP/TSH/INR   No results for input(s): CHOL, HDL, LDL, TRIG, CHOLHDLRATIO in the last 19641 hours.  No results for input(s): LDL in the last 56316 hours.  Recent Labs   Lab Test 09/15/21  1444      POTASSIUM 4.0   CHLORIDE 110*   CO2 27   GLC 90   BUN 7   CR 0.89   GFRESTIMATED >90   IVETH 9.0     Recent Labs   Lab Test 09/15/21  1444   CR 0.89     No results for input(s): A1C in the last 63478 hours.       Recent Labs   Lab Test 09/15/21  1444   WBC 5.7   HGB 15.2   HCT 44.7   MCV 86        Recent Labs   Lab Test 09/15/21  1444   HGB 15.2    No results for input(s): TROPONINI in the last 73303 hours.  No results for input(s): BNP, NTBNPI, NTBNP in the last 19558 hours.  No results for input(s): TSH in the last 76112 hours.  No results for input(s): INR in the last 35618 hours.     Estelle Quiros MD

## 2022-09-20 NOTE — LETTER
September 20, 2022      Brenda CANDICE Rizzo  [address]  [place] [facility] [address]              Mr. Rizzo was seen today at [facility].            Sincerely,      Estelle Quiros MD

## 2022-09-28 DIAGNOSIS — R10.13 EPIGASTRIC PAIN: ICD-10-CM

## 2022-10-03 NOTE — PROGRESS NOTES
"  HEART CARE ENCOUNTER CONSULTATON NOTE      [facility]  [phone]      Assessment/Recommendations   Assessment/Plan:    Brenda Rizzo is a very pleasant 22 year old gentleman who presents today for further evaluation of SVT treatment.    Supraventricular tachycardia   - symptomatic with palpitations.  Likely AVNRT, though AVRT and AT are possible.  - We reviewed SVT mechanisms and reviewed treatment options including electrophysiology study and ablation vs continued medical therapy.  We reviewed the nature of EP studies and ablation for SVT, success rates depending on the specific SVT mechanism, procedural risks (including groin hematoma, tamponade, heart block, stroke) and recovery expectations.  - Will proceed with EP study and ablation  - TTE scheduled    ANJELICA with ICE on standby  Anesthesia with EP RN and Physician  JOSIE BMP on day of procedure          History of Present Illness/Subjective    HPI: Brenda Rizzo is a very pleasant 22 year old gentleman with PMH of SVT, anxiety and deprssion who presents today for further evaluation of SVT treatment.    He first started having palpitations in 2017-18. Laureano from 2018 personally reviewed. Three episodes of narrow complex tachycardia with HR upto 20-40 range lasting upto a few minutes at the most.  His palpitations have since worsened and he has symptoms multiple times a day now. They last about a minute, sometimes controlled by valsalva breath holding. He says they are affecting the quality of his life and his work has been interrupted due to these symptoms as well.         Physical Examination  Review of Systems   Vitals: /60 (BP Location: Right arm, Patient Position: Sitting, Cuff Size: Adult Regular)   Pulse 58   Resp 16   Ht 1.88 m (6' 2\")   Wt 71.7 kg (158 lb)   BMI 20.29 kg/m    BMI= Body mass index is 20.29 kg/m .  Wt Readings from Last 3 Encounters:   10/04/22 71.7 kg (158 lb)   09/20/22 70.8 kg (156 lb)   08/23/22 70.9 " kg (156 lb 3.2 oz)       General Appearance:   no distress, normal body habitus   ENT/Mouth: membranes moist, no oral lesions or bleeding gums.      EYES:  no scleral icterus, normal conjunctivae   Neck: no carotid bruits or thyromegaly   Chest/Lungs:   lungs are clear to auscultation, no rales or wheezing,  sternal scar, equal chest wall expansion    Cardiovascular:   Regular. Normal first and second heart sounds with no murmurs, rubs, or gallops; the carotid, radial and posterior tibial pulses are intact, no edema bilaterally    Abdomen:  no organomegaly, masses, bruits, or tenderness; bowel sounds are present   Extremities: no cyanosis or clubbing   Skin: no xanthelasma, warm.    Neurologic: normal  bilateral, no tremors     Psychiatric: alert and oriented x3, calm        Please refer above for cardiac ROS details.        Medical History  Surgical History Family History Social History   Past Medical History:   Diagnosis Date     Unspecified part of open fracture of clavicle 10/08/2004     Past Surgical History:   Procedure Laterality Date     SURGICAL HISTORY OF -   2002    penoscrotal web repair     Family History   Problem Relation Age of Onset     Hypertension Maternal Grandmother      Depression Maternal Grandmother         Social History     Socioeconomic History     Marital status: Single     Spouse name: Not on file     Number of children: Not on file     Years of education: Not on file     Highest education level: Not on file   Occupational History     Not on file   Tobacco Use     Smoking status: Current Every Day Smoker     Years: 1.00     Types: Vaping Device     Smokeless tobacco: Never Used   Vaping Use     Vaping Use: Every day     Substances: Nicotine     Devices: Refillable tank   Substance and Sexual Activity     Alcohol use: Yes     Comment: social     Drug use: Not Currently     Types: Marijuana     Comment: daily     Sexual activity: Yes   Other Topics Concern     Not on file   Social  History Narrative     Not on file     Social Determinants of Health     Financial Resource Strain: Not on file   Food Insecurity: Not on file   Transportation Needs: Not on file   Physical Activity: Not on file   Stress: Not on file   Social Connections: Not on file   Intimate Partner Violence: Not on file   Housing Stability: Not on file           Medications  Allergies   Current Outpatient Medications   Medication Sig Dispense Refill     escitalopram (LEXAPRO) 10 MG tablet Take 1 tablet (10 mg) by mouth daily 90 tablet 3     hydrOXYzine (ATARAX) 25 MG tablet Take 1 tablet (25 mg) by mouth 3 times daily as needed for itching 30 tablet 0     omeprazole (PRILOSEC) 20 MG DR capsule TAKE 1 CAPSULE(20 MG) BY MOUTH DAILY 30 capsule 0     No Known Allergies       Lab Results    Chemistry/lipid CBC Cardiac Enzymes/BNP/TSH/INR   No results for input(s): CHOL, HDL, LDL, TRIG, CHOLHDLRATIO in the last 43350 hours.  No results for input(s): LDL in the last 46452 hours.  Recent Labs   Lab Test 09/15/21  1444      POTASSIUM 4.0   CHLORIDE 110*   CO2 27   GLC 90   BUN 7   CR 0.89   GFRESTIMATED >90   IVETH 9.0     Recent Labs   Lab Test 09/15/21  1444   CR 0.89     No results for input(s): A1C in the last 52245 hours.       Recent Labs   Lab Test 09/15/21  1444   WBC 5.7   HGB 15.2   HCT 44.7   MCV 86        Recent Labs   Lab Test 09/15/21  1444   HGB 15.2    No results for input(s): TROPONINI in the last 14566 hours.  No results for input(s): BNP, NTBNPI, NTBNP in the last 93014 hours.  No results for input(s): TSH in the last 65934 hours.  No results for input(s): INR in the last 10760 hours.     Antoine Ruiz MD

## 2022-10-04 ENCOUNTER — DOCUMENTATION ONLY (OUTPATIENT)
Dept: CARDIOLOGY | Facility: CLINIC | Age: 22
End: 2022-10-04

## 2022-10-04 ENCOUNTER — OFFICE VISIT (OUTPATIENT)
Dept: CARDIOLOGY | Facility: CLINIC | Age: 22
End: 2022-10-04
Payer: COMMERCIAL

## 2022-10-04 VITALS
RESPIRATION RATE: 16 BRPM | HEIGHT: 74 IN | WEIGHT: 158 LBS | BODY MASS INDEX: 20.28 KG/M2 | HEART RATE: 58 BPM | DIASTOLIC BLOOD PRESSURE: 60 MMHG | SYSTOLIC BLOOD PRESSURE: 116 MMHG

## 2022-10-04 DIAGNOSIS — I47.10 SUPRAVENTRICULAR TACHYCARDIA (H): Primary | ICD-10-CM

## 2022-10-04 DIAGNOSIS — I47.10 SUPRAVENTRICULAR TACHYCARDIA (H): ICD-10-CM

## 2022-10-04 PROCEDURE — 99204 OFFICE O/P NEW MOD 45 MIN: CPT | Performed by: INTERNAL MEDICINE

## 2022-10-04 RX ORDER — SODIUM CHLORIDE 9 MG/ML
100 INJECTION, SOLUTION INTRAVENOUS CONTINUOUS
Status: CANCELLED | OUTPATIENT
Start: 2022-10-04

## 2022-10-04 RX ORDER — LIDOCAINE 40 MG/G
CREAM TOPICAL
Status: CANCELLED | OUTPATIENT
Start: 2022-10-04

## 2022-10-04 RX ORDER — FENTANYL CITRATE 50 UG/ML
25 INJECTION, SOLUTION INTRAMUSCULAR; INTRAVENOUS
Status: CANCELLED | OUTPATIENT
Start: 2022-10-04

## 2022-10-04 NOTE — PROGRESS NOTES
H&P  PMD []  Received [] OV: [x] KA  Date: 10/4/22 Teach  []   Orders  I [x] P  [x]  Letter []   COVID  FV/EPIC []  Date:    Home []  External  []  Date: AC:  None [x]  Continue  []   Hold:  AM Meds: Take all [x]   Hold:         2001  Home:[phone] (home) Cell:[phone] (mobile)  Emergency Contact: Dina Meredith [phone]  PCP: Dallas Leroy, [phone]    Important patient information for CSC/Cath Lab staff : None    [facility] EP Cath Lab Procedure Order   Ablation Type:Supraventricular Tachycardia  Ordering Provider: Dr Ruiz  Ordering Date: 10/4/2022  Diagnosis:  SVT  Anticipated Case Duration:  Standard   Scheduling Timeframe:  Next Available  Scheduling Restrictions: None  Scheduling Contact: Please contact pt to schedule, if you are unable to schedule date within the next 24 hours please contact pt to update on scheduling process  EP RN Follow Up Apt: Does not need follow up apt with EP RN  Current Device/Device Co Needed for Procedure: None NoneNone  Pre-Procedural Testing needed: Home COVID testing  Mapping System Required:  ANJELICA    ICE on standby  Anesthesia:  MAC- Monitored Anesthesia Care    [facility] EP Cath Lab Prep   H&P:  to schedule with PMD  Pre-op Labs: CBC, BMP, Beta HcG if appropriate, and INR if on Warfarin will be ordered AM of procedure and Review of most recent labs, WEL for procedure  T&S Pre-Procedure Review: T&S ordered AM of procedure- Pt does NOT have listed/reported blood antibodies and/or hx of blood transfusions without re-peat T&S after  Medical Records Pertinent for Procedure:  n/a    Patient Education:  Teach with Patient: Will be completed via phone prior to procedure, and letter was also sent to pt via mail/[employer] [employer] with written pre-procedural instructions.  Risks Reviewed:   Cardiac Catheter Ablation    <1% risk for the following: hypotension, hemorrhage, vascular injury including perforation of vein, artery or heart, thrombophlebitis, systemic or pulmonic  emboli; cardiac perforation, (tamponade), infection, pneumothorax, arrhythmias, proarrhythmic effects of drugs, radiation exposure.    1-2% complete heart block (for AVNRT or septol accessory pathway).    <0.5% CVA or MI    <0.1% death    If external defibrillation is needed, 75% risk for superficial burn.    1-2% tamponade and aortic puncture with left sided transeptal approach for left side ANJELICA - increase risk of CVA to <2%.    Late arrhythmia recurrences depends upon the primary rhythm disturbance.      Pre-Procedure Instructions:  NPO after midnight, Remove all jewelry prior to coming in for procedure, Shower prior to arrival, Notified patient of time and date of procedure by CV , Transportation arrangements needed s/p procedure, Post-procedure follow up process, Sedation plan/orders and Pre-procedure letter was sent to pt    Pre-Procedure Medication Instructions:  Instructions given to pt regarding anticoagulants: None- N/A  Instructions given to pt regarding antiarrhythmic medication: None; N/A  Instructions given to pt regarding PPI medication:N/A  Instructions for medication, other than anticoagulants and antiarrhythmics listed above, given to pt: to take all morning medications with small sips of water, with the exception of OTC supplements and MVI   Allergies: Reviewed allergies, no concerns regarding orders for procedure  No Known Allergies    Current Outpatient Medications:      escitalopram (LEXAPRO) 10 MG tablet, Take 1 tablet (10 mg) by mouth daily, Disp: 90 tablet, Rfl: 3     hydrOXYzine (ATARAX) 25 MG tablet, Take 1 tablet (25 mg) by mouth 3 times daily as needed for itching, Disp: 30 tablet, Rfl: 0     omeprazole (PRILOSEC) 20 MG DR capsule, TAKE 1 CAPSULE(20 MG) BY MOUTH DAILY, Disp: 30 capsule, Rfl: 0    Documentation Date:10/4/2022 3:15 PM  Unique Ma RN

## 2022-10-04 NOTE — PATIENT INSTRUCTIONS
[facility]  Cardiac Electrophysiology  [address]  [place] [id number]   Office: [phone]  Fax: [phone]       Thank you for seeing us in clinic today - it is a pleasure to be a part of your care team.  Below is a summary of our plan from today's visit.      Supraventricular tachycardia - symptomatic with palpitations.  Likely AVNRT, though AVRT and AT are possible.  - We reviewed SVT mechanisms and reviewed treatment options including electrophysiology study and ablation vs continued medical therapy.  We reviewed the nature of EP studies and ablation for SVT, success rates depending on the specific SVT mechanism, procedural risks (including groin hematoma, tamponade, heart block, stroke) and recovery expectations.  - Will proceed with EP study and ablation    Please do not hesitate to be in touch with our office at [phone] with any questions that may arise.      Thank you for trusting us with your care,    Antoine Ruiz MD  Clinical Cardiac Electrophysiology  [facility]  [address]  [place] [address]   Office: [phone]  Fax: [phone]

## 2022-10-04 NOTE — LETTER
10/4/2022    Dallas Leroy MD  [address] [facility] [address]    RE: Brenda Rizzo       Dear Colleague,     I had the pleasure of seeing Brenda Rizzo in the [facility].    HEART CARE ENCOUNTER CONSULTATON NOTE      [facility]  [phone]      Assessment/Recommendations   Assessment/Plan:    Brenda Rizzo is a very pleasant 22 year old gentleman who presents today for further evaluation of SVT treatment.    Supraventricular tachycardia   - symptomatic with palpitations.  Likely AVNRT, though AVRT and AT are possible.  - We reviewed SVT mechanisms and reviewed treatment options including electrophysiology study and ablation vs continued medical therapy.  We reviewed the nature of EP studies and ablation for SVT, success rates depending on the specific SVT mechanism, procedural risks (including groin hematoma, tamponade, heart block, stroke) and recovery expectations.  - Will proceed with EP study and ablation  - TTE scheduled    ANJELICA with ICE on standby  Anesthesia with EP RN and Physician  JOSIE BMP on day of procedure          History of Present Illness/Subjective    HPI: Brenda Rizzo is a very pleasant 22 year old gentleman with PMH of SVT, anxiety and deprssion who presents today for further evaluation of SVT treatment.    He first started having palpitations in 2017-18. Laureano from 2018 personally reviewed. Three episodes of narrow complex tachycardia with HR upto 20-40 range lasting upto a few minutes at the most.  His palpitations have since worsened and he has symptoms multiple times a day now. They last about a minute, sometimes controlled by valsalva breath holding. He says they are affecting the quality of his life and his work has been interrupted due to these symptoms as well.         Physical Examination  Review of Systems   Vitals: /60 (BP Location: Right arm, Patient Position: Sitting, Cuff Size: Adult Regular)   Pulse 58    "Resp 16   Ht 1.88 m (6' 2\")   Wt 71.7 kg (158 lb)   BMI 20.29 kg/m    BMI= Body mass index is 20.29 kg/m .  Wt Readings from Last 3 Encounters:   10/04/22 71.7 kg (158 lb)   09/20/22 70.8 kg (156 lb)   08/23/22 70.9 kg (156 lb 3.2 oz)       General Appearance:   no distress, normal body habitus   ENT/Mouth: membranes moist, no oral lesions or bleeding gums.      EYES:  no scleral icterus, normal conjunctivae   Neck: no carotid bruits or thyromegaly   Chest/Lungs:   lungs are clear to auscultation, no rales or wheezing,  sternal scar, equal chest wall expansion    Cardiovascular:   Regular. Normal first and second heart sounds with no murmurs, rubs, or gallops; the carotid, radial and posterior tibial pulses are intact, no edema bilaterally    Abdomen:  no organomegaly, masses, bruits, or tenderness; bowel sounds are present   Extremities: no cyanosis or clubbing   Skin: no xanthelasma, warm.    Neurologic: normal  bilateral, no tremors     Psychiatric: alert and oriented x3, calm        Please refer above for cardiac ROS details.        Medical History  Surgical History Family History Social History   Past Medical History:   Diagnosis Date     Unspecified part of open fracture of clavicle 10/08/2004     Past Surgical History:   Procedure Laterality Date     SURGICAL HISTORY OF -   2002    penoscrotal web repair     Family History   Problem Relation Age of Onset     Hypertension Maternal Grandmother      Depression Maternal Grandmother         Social History     Socioeconomic History     Marital status: Single     Spouse name: Not on file     Number of children: Not on file     Years of education: Not on file     Highest education level: Not on file   Occupational History     Not on file   Tobacco Use     Smoking status: Current Every Day Smoker     Years: 1.00     Types: Vaping Device     Smokeless tobacco: Never Used   Vaping Use     Vaping Use: Every day     Substances: Nicotine     Devices: Refillable tank "   Substance and Sexual Activity     Alcohol use: Yes     Comment: social     Drug use: Not Currently     Types: Marijuana     Comment: daily     Sexual activity: Yes   Other Topics Concern     Not on file   Social History Narrative     Not on file     Social Determinants of Health     Financial Resource Strain: Not on file   Food Insecurity: Not on file   Transportation Needs: Not on file   Physical Activity: Not on file   Stress: Not on file   Social Connections: Not on file   Intimate Partner Violence: Not on file   Housing Stability: Not on file           Medications  Allergies   Current Outpatient Medications   Medication Sig Dispense Refill     escitalopram (LEXAPRO) 10 MG tablet Take 1 tablet (10 mg) by mouth daily 90 tablet 3     hydrOXYzine (ATARAX) 25 MG tablet Take 1 tablet (25 mg) by mouth 3 times daily as needed for itching 30 tablet 0     omeprazole (PRILOSEC) 20 MG DR capsule TAKE 1 CAPSULE(20 MG) BY MOUTH DAILY 30 capsule 0     No Known Allergies       Lab Results    Chemistry/lipid CBC Cardiac Enzymes/BNP/TSH/INR   No results for input(s): CHOL, HDL, LDL, TRIG, CHOLHDLRATIO in the last 39569 hours.  No results for input(s): LDL in the last 90800 hours.  Recent Labs   Lab Test 09/15/21  1444      POTASSIUM 4.0   CHLORIDE 110*   CO2 27   GLC 90   BUN 7   CR 0.89   GFRESTIMATED >90   IVETH 9.0     Recent Labs   Lab Test 09/15/21  1444   CR 0.89     No results for input(s): A1C in the last 10477 hours.       Recent Labs   Lab Test 09/15/21  1444   WBC 5.7   HGB 15.2   HCT 44.7   MCV 86        Recent Labs   Lab Test 09/15/21  1444   HGB 15.2    No results for input(s): TROPONINI in the last 73270 hours.  No results for input(s): BNP, NTBNPI, NTBNP in the last 91634 hours.  No results for input(s): TSH in the last 50323 hours.  No results for input(s): INR in the last 92490 hours.     Antoine Ruiz MD    Thank you for allowing me to participate in the care of your patient.      Sincerely,      Antoine Ruiz MD      Heart Care  cc:   Estelle Quiros MD  [address] [facility]  [address]  [place] [address]

## 2022-10-11 ENCOUNTER — HOSPITAL ENCOUNTER (OUTPATIENT)
Dept: CARDIOLOGY | Facility: HOSPITAL | Age: 22
Discharge: HOME | End: 2022-10-11
Attending: INTERNAL MEDICINE
Payer: COMMERCIAL

## 2022-10-11 DIAGNOSIS — I47.10 SUPRAVENTRICULAR TACHYCARDIA (H): ICD-10-CM

## 2022-10-11 LAB
LVEF ECHO: NORMAL
LVEF ECHO: NORMAL

## 2022-10-11 PROCEDURE — 93306 TTE W/DOPPLER COMPLETE: CPT | Mod: 26 | Performed by: INTERNAL MEDICINE

## 2022-10-11 PROCEDURE — 93306 TTE W/DOPPLER COMPLETE: CPT

## 2022-10-11 PROCEDURE — 93270 REMOTE 30 DAY ECG REV/REPORT: CPT

## 2022-10-19 PROCEDURE — 93272 ECG/REVIEW INTERPRET ONLY: CPT | Performed by: INTERNAL MEDICINE

## 2022-10-29 ENCOUNTER — APPOINTMENT (OUTPATIENT)
Dept: CT IMAGING | Facility: CLINIC | Age: 22
End: 2022-10-29
Attending: EMERGENCY MEDICINE
Payer: COMMERCIAL

## 2022-10-29 ENCOUNTER — HOSPITAL ENCOUNTER (EMERGENCY)
Facility: CLINIC | Age: 22
Discharge: HOME | End: 2022-10-29
Attending: EMERGENCY MEDICINE | Admitting: EMERGENCY MEDICINE
Payer: COMMERCIAL

## 2022-10-29 VITALS
SYSTOLIC BLOOD PRESSURE: 118 MMHG | HEIGHT: 74 IN | RESPIRATION RATE: 18 BRPM | OXYGEN SATURATION: 99 % | TEMPERATURE: 97.6 F | HEART RATE: 79 BPM | DIASTOLIC BLOOD PRESSURE: 74 MMHG | WEIGHT: 160 LBS | BODY MASS INDEX: 20.53 KG/M2

## 2022-10-29 DIAGNOSIS — R10.32 ABDOMINAL PAIN, LEFT LOWER QUADRANT: ICD-10-CM

## 2022-10-29 DIAGNOSIS — R10.9 LEFT FLANK PAIN: ICD-10-CM

## 2022-10-29 LAB
ALBUMIN UR-MCNC: NEGATIVE MG/DL
APPEARANCE UR: CLEAR
BILIRUB UR QL STRIP: NEGATIVE
COLOR UR AUTO: YELLOW
GLUCOSE UR STRIP-MCNC: NEGATIVE MG/DL
HGB UR QL STRIP: NEGATIVE
KETONES UR STRIP-MCNC: 15 MG/DL
LEUKOCYTE ESTERASE UR QL STRIP: NEGATIVE
MUCOUS THREADS #/AREA URNS LPF: PRESENT /LPF
NITRATE UR QL: NEGATIVE
PH UR STRIP: 6 [PH] (ref 5–7)
RBC URINE: 0 /HPF
SP GR UR STRIP: 1.02 (ref 1–1.03)
SQUAMOUS EPITHELIAL: <1 /HPF
UROBILINOGEN UR STRIP-MCNC: NORMAL MG/DL
WBC URINE: 1 /HPF

## 2022-10-29 PROCEDURE — 81001 URINALYSIS AUTO W/SCOPE: CPT | Performed by: EMERGENCY MEDICINE

## 2022-10-29 PROCEDURE — 74176 CT ABD & PELVIS W/O CONTRAST: CPT

## 2022-10-29 PROCEDURE — 99284 EMERGENCY DEPT VISIT MOD MDM: CPT | Mod: 25 | Performed by: EMERGENCY MEDICINE

## 2022-10-29 PROCEDURE — 99285 EMERGENCY DEPT VISIT HI MDM: CPT | Performed by: EMERGENCY MEDICINE

## 2022-10-29 ASSESSMENT — ENCOUNTER SYMPTOMS
CONSTITUTIONAL NEGATIVE: 1
CONSTIPATION: 1
ENDOCRINE NEGATIVE: 1
CARDIOVASCULAR NEGATIVE: 1
ABDOMINAL PAIN: 1
PSYCHIATRIC NEGATIVE: 1
HEMATOLOGIC/LYMPHATIC NEGATIVE: 1
ALLERGIC/IMMUNOLOGIC NEGATIVE: 1
EYES NEGATIVE: 1
FLANK PAIN: 1
NEUROLOGICAL NEGATIVE: 1
RESPIRATORY NEGATIVE: 1

## 2022-10-29 ASSESSMENT — ACTIVITIES OF DAILY LIVING (ADL)
ADLS_ACUITY_SCORE: 35
ADLS_ACUITY_SCORE: 35

## 2022-10-29 NOTE — LETTER
October 29, 2022      To Whom It May Concern:      Brenda Rizzo was seen in our Emergency Department today, 10/29/22.  Please excuse him from missing work due to his visit in the emergency department.  Further follow-up care may be required if his symptoms persist or there are new concerns.  This work note is valid until October 31, 2022      Sincerely,          Ean Cooley MD

## 2022-10-29 NOTE — ED PROVIDER NOTES
History     Chief Complaint   Patient presents with     Abdominal Pain     Pt reports pain to left testicle pain on Thursday night, now have pain to LLQ. Pt denies urinary symptoms at this time.      NURY Rizzo is a 22 year old male who presents with left testicular pain and left lower quadrant abdominal pain.    Patient's medical records show prior diagnosis of SVT anxiety and depression.  Patient's prescribed medications were reviewed.    On examination patient patient reports he developed pain in the left lower quadrant left flank rating to the left groin.  No testicular pain or swelling and no difficulty initiating urinary stream.  Patient reports that when he pushes to defecate he has some discomfort in the left lower quadrant.  Reports his stools been mostly soft and runny but not bloody and no melena.  Patient reports no prior history of abdominal surgery.  Patient also reports no fever or chills and no unintentional weight loss although he woke up in a sweat today.  Due to pain and discomfort change in his stool pattern over the last 2 months he presents for further care.    Allergies:  No Known Allergies    Problem List:    Patient Active Problem List    Diagnosis Date Noted     Anxiety and depression 11/26/2018     Priority: Medium     Supraventricular tachycardia (H) 06/04/2018     Priority: Medium     Palpitations 06/04/2018     Priority: Medium     Anxiety 06/04/2018     Priority: Medium        Past Medical History:    Past Medical History:   Diagnosis Date     Unspecified part of open fracture of clavicle 10/08/2004       Past Surgical History:    Past Surgical History:   Procedure Laterality Date     SURGICAL HISTORY OF -   2002    penoscrotal web repair       Family History:    Family History   Problem Relation Age of Onset     Hypertension Maternal Grandmother      Depression Maternal Grandmother        Social History:  Marital Status:  Single [1]  Social History     Tobacco Use      "Smoking status: Every Day     Types: Vaping Device     Smokeless tobacco: Never   Vaping Use     Vaping Use: Every day     Substances: Nicotine     Devices: [employer] Systemsble tank   Substance Use Topics     Alcohol use: Yes     Comment: social     Drug use: Not Currently     Types: Marijuana     Comment: daily        Medications:    escitalopram (LEXAPRO) 10 MG tablet  hydrOXYzine (ATARAX) 25 MG tablet  omeprazole (PRILOSEC) 20 MG DR capsule          Review of Systems   Constitutional: Negative.    HENT: Negative.    Eyes: Negative.    Respiratory: Negative.    Cardiovascular: Negative.    Gastrointestinal: Positive for abdominal pain and constipation.   Endocrine: Negative.    Genitourinary: Positive for flank pain.   Skin: Negative.    Allergic/Immunologic: Negative.    Neurological: Negative.    Hematological: Negative.    Psychiatric/Behavioral: Negative.    All other systems reviewed and are negative.      Physical Exam   BP: 108/67  Pulse: 73  Temp: 96.8  F (36  C)  Resp: 18  Height: 188 cm (6' 2\")  Weight: 72.6 kg (160 lb)  SpO2: 97 %      Physical Exam  Constitutional:       General: He is not in acute distress.     Appearance: He is well-developed. He is not ill-appearing, toxic-appearing or diaphoretic.   HENT:      Head: Normocephalic and atraumatic.   Eyes:      Extraocular Movements: Extraocular movements intact.      Pupils: Pupils are equal, round, and reactive to light.   Cardiovascular:      Rate and Rhythm: Normal rate and regular rhythm.      Heart sounds: Normal heart sounds.   Pulmonary:      Effort: Pulmonary effort is normal. No respiratory distress.      Breath sounds: Normal breath sounds. No wheezing or rhonchi.   Chest:      Chest wall: No tenderness.   Abdominal:      General: Abdomen is flat.      Palpations: Abdomen is soft.      Tenderness: There is abdominal tenderness in the left lower quadrant.       Musculoskeletal:        Back:    Skin:     Capillary Refill: Capillary refill takes less " "than 2 seconds.      Coloration: Skin is not cyanotic, jaundiced, mottled or pale.      Findings: No erythema or rash.   Neurological:      General: No focal deficit present.      Mental Status: He is alert and oriented to person, place, and time.      Cranial Nerves: No cranial nerve deficit.      Motor: No weakness.   Psychiatric:         Mood and Affect: Mood normal. Mood is not anxious or depressed.         Behavior: Behavior normal.         ED Course                 Procedures              Critical Care time:  none               ED medications: none      ED Vitals:  Vitals:    10/29/22 1259 10/29/22 1323   BP: 108/67 118/74   Pulse: 73 79   Resp:  18   Temp: 96.8  F (36  C) 97.6  F (36.4  C)   TempSrc:  Tympanic   SpO2: 97% 99%   Weight:  72.6 kg (160 lb)   Height:  1.88 m (6' 2\")     ED labs and imaging:  Results for orders placed or performed during the hospital encounter of 10/29/22   CT Abdomen Pelvis w/o Contrast     Status: None    Narrative    EXAM: CT ABDOMEN AND PELVIS WITHOUT CONTRAST  LOCATION: [facility]  DATE/TIME: 10/29/2022, 2:51 PM    INDICATION: Left flank and left lower quadrant pain.  COMPARISON: None.  TECHNIQUE: CT scan of the abdomen and pelvis was performed without IV contrast. Multiplanar reformats were obtained. Dose reduction techniques were used.  CONTRAST: None.    FINDINGS:   LOWER CHEST: The visualized lung bases are clear.    HEPATOBILIARY: Normal.    PANCREAS: Normal.    SPLEEN: Normal.    ADRENAL GLANDS: Normal.    KIDNEYS/BLADDER: No significant mass, stone, or hydronephrosis.    BOWEL: No obstruction or inflammatory change.    LYMPH NODES: No lymphadenopathy.    VASCULATURE: Unremarkable.    PELVIC ORGANS: Unremarkable.    MUSCULOSKELETAL: Unremarkable.      Impression    IMPRESSION:   No acute abnormality in the abdomen or pelvis. No cause for left lower quadrant pain is identified. No urinary calculi or evidence for urinary obstruction.       UA " with Microscopic reflex to Culture     Status: Abnormal    Specimen: Urine, Midstream   Result Value Ref Range    Color Urine Yellow Colorless, Straw, Light Yellow, Yellow    Appearance Urine Clear Clear    Glucose Urine Negative Negative mg/dL    Bilirubin Urine Negative Negative    Ketones Urine 15 (A) Negative mg/dL    Specific Gravity Urine 1.020 1.003 - 1.035    Blood Urine Negative Negative    pH Urine 6.0 5.0 - 7.0    Protein Albumin Urine Negative Negative mg/dL    Urobilinogen Urine Normal Normal, 2.0 mg/dL    Nitrite Urine Negative Negative    Leukocyte Esterase Urine Negative Negative    Mucus Urine Present (A) None Seen /LPF    RBC Urine 0 <=2 /HPF    WBC Urine 1 <=5 /HPF    Squamous Epithelials Urine <1 <=1 /HPF    Narrative    Urine Culture not indicated         Assessments & Plan (with Medical Decision Making)   Assessment Summary and Clinical Impression: 22-year-old male presented with 3-day history of left testicular pain and now left lower quadrant abdominal pain.  Patient's medical records show a history of anxiety and depression and prior diagnosis of SVT.  He arrived afebrile and hemodynamically normal.  No acute distress.  No testicular pain or groin swelling.  No penile lesion or discharge.  Localized left lower quadrant tenderness.  Patient reported left flank pain but no CVA tenderness.  No testicular pain or swelling and no inguinal bulge or pain.  Imaging obtained given competing diagnosis revealing no acute findings.  Patient was discharged with abdominal pain of uncertain cause with low threshold to return if worsening symptoms and outpatient follow-up.      ED course and plan:  Reviewed the medical record.  Prior abdominal imaging from September 15, 2021.  See details in the medical record.  Reviewed possible causes of her symptoms reported and broad differential was considered.  Urinalysis and advanced imaging was obtained given competing diagnoses include diverticulitis, constipation,  obstructing urinary stone versus other.  Work-up revealed urinalysis revealing no acute infection there was ketones noted in the urine no hematuria.  CT imaging revealed no acute intra-abdominal process or explain patient's pain is reported.  See details in radiology report     Patient was reevaluated after urinalysis and CT. we discussed the findings on his urinalysis with ketones and mucus.  He was reassured by his assessment and expressed comfort going home with outpatient follow-up with low threshold to return if there are new symptoms of concern       Disclaimer: This note consists of symbols derived from keyboarding, dictation and/or voice recognition software. As a result, there may be errors in the script that have gone undetected. Please consider this when interpreting information found in this chart.  I have reviewed the nursing notes.    I have reviewed the findings, diagnosis, plan and need for follow up with the patient.       Discharge Medication List as of 10/29/2022  5:36 PM          Final diagnoses:   Abdominal pain, left lower quadrant   Left flank pain       10/29/2022   [facility] EMERGENCY DEPT     Brennen Cooley MD  10/29/22 3823

## 2022-10-29 NOTE — ED NOTES
"Pt states since Thursday he has had LLQ pain that radiates into his left testicle.  No fevers.  Pain is dull/achy but sharp when trying to have a bowel movement.  Pt states \"it's been about a month since I have had a normal bowel movement.  They have been runny.\"  No urinary issues.  "

## 2022-10-29 NOTE — ED TRIAGE NOTES
Pt reports pain to left testicle pain on Thursday night, now have pain to LLQ. Pt denies urinary symptoms at this time.

## 2022-11-20 ENCOUNTER — HEALTH MAINTENANCE LETTER (OUTPATIENT)
Age: 22
End: 2022-11-20

## 2023-01-09 ENCOUNTER — VIRTUAL VISIT (OUTPATIENT)
Dept: PEDIATRICS | Facility: CLINIC | Age: 23
End: 2023-01-09
Payer: COMMERCIAL

## 2023-01-09 DIAGNOSIS — F90.2 ATTENTION DEFICIT HYPERACTIVITY DISORDER (ADHD), COMBINED TYPE: Primary | ICD-10-CM

## 2023-01-09 DIAGNOSIS — I47.10 SUPRAVENTRICULAR TACHYCARDIA (H): ICD-10-CM

## 2023-01-09 DIAGNOSIS — I47.10 SUPRAVENTRICULAR TACHYCARDIA (H): Primary | ICD-10-CM

## 2023-01-09 PROCEDURE — 99213 OFFICE O/P EST LOW 20 MIN: CPT | Mod: 95 | Performed by: PEDIATRICS

## 2023-01-09 ASSESSMENT — PATIENT HEALTH QUESTIONNAIRE - PHQ9
10. IF YOU CHECKED OFF ANY PROBLEMS, HOW DIFFICULT HAVE THESE PROBLEMS MADE IT FOR YOU TO DO YOUR WORK, TAKE CARE OF THINGS AT HOME, OR GET ALONG WITH OTHER PEOPLE: VERY DIFFICULT
SUM OF ALL RESPONSES TO PHQ QUESTIONS 1-9: 16
SUM OF ALL RESPONSES TO PHQ QUESTIONS 1-9: 16

## 2023-01-09 NOTE — PROGRESS NOTES
Brenda is a 22 year old who is being evaluated via a billable video visit.      How would you like to obtain your AVS? MyChart  If the video visit is dropped, the invitation should be resent by: Text to cell phone: [phone]  Will anyone else be joining your video visit? No          Assessment & Plan     Attention deficit hyperactivity disorder (ADHD), combined type  Defer treatment until after the SVT and right bundle branch block has been treated.     Supraventricular tachycardia (H)  Referred the patient back to cardiology, contact information provided.         22 minutes spent on the date of the encounter doing chart review, history and exam, documentation and further activities per the note       Depression Screening Follow Up    PHQ 1/9/2023   PHQ-9 Total Score 16   Q9: Thoughts of better off dead/self-harm past 2 weeks Not at all         Return in about 2 months (around 3/9/2023) for ADHD recheck (with internal medicine or family practice); follow up with cardiology ASAP.    Annika Ramirez MD  [facility]    Pepito Gutierrez is a 22 year old, presenting for the following health issues:  Medication Request      History of Present Illness       Reason for visit:  Discuss starting ADHD medication again.    He eats 2-3 servings of fruits and vegetables daily.He consumes 2 sweetened beverage(s) daily.He exercises with enough effort to increase his heart rate 60 or more minutes per day.  He exercises with enough effort to increase his heart rate 4 days per week. He is missing 1 dose(s) of medications per week.  He is not taking prescribed medications regularly due to remembering to take.    Today's PHQ-9         PHQ-9 Total Score: 16    PHQ-9 Q9 Thoughts of better off dead/self-harm past 2 weeks :   Not at all    How difficult have these problems made it for you to do your work, take care of things at home, or get along with other people: Very difficult      =======================================  Brenda would like to restart his ADHD medications.  He was diagnosed with ADHD in 2017, records from [facility] were reviewed by me today. He was previously treated with Adderall ER 15 mg.    He had some palpitations, and underwent a Zio patch monitoring evaluation.  SVT was noted.  Ablation or medication were recommended, but patient did not follow up.  Patient had understood that ablation was optional and no further treatment was needed.  The cardiology note from 10/4/22 is reviewed by me today ands suggests otherwise.  There is a note from 12/6/2022 of the cardiology department reaching out the Brenda, but they never connected.     Review of Systems   Constitutional, HEENT, cardiovascular, pulmonary, gi and gu systems are negative, except as otherwise noted.      Objective           Vitals:  No vitals were obtained today due to virtual visit.    Physical Exam   GENERAL: Healthy, alert and no distress  EYES: Eyes grossly normal to inspection.  No discharge or erythema, or obvious scleral/conjunctival abnormalities.  RESP: No audible wheeze, cough, or visible cyanosis.  No visible retractions or increased work of breathing.    SKIN: Visible skin clear. No significant rash, abnormal pigmentation or lesions.  NEURO: Cranial nerves grossly intact.  Mentation and speech appropriate for age.  PSYCH: Mentation appears normal, affect normal/bright, judgement and insight intact, normal speech and appearance well-groomed.                Video-Visit Details    Type of service:  Video Visit   Video Start Time: 9:35 AM  Video End Time:9:45 AM    Originating Location (pt. Location): Home    Distant Location (provider location):  On-site  Platform used for Video Visit: Synch[employer]

## 2023-01-10 ENCOUNTER — HOSPITAL ENCOUNTER (OUTPATIENT)
Facility: HOSPITAL | Age: 23
End: 2023-01-10
Attending: INTERNAL MEDICINE | Admitting: INTERNAL MEDICINE
Payer: COMMERCIAL

## 2023-01-10 DIAGNOSIS — I47.10 SUPRAVENTRICULAR TACHYCARDIA (H): ICD-10-CM

## 2023-01-13 DIAGNOSIS — I47.10 SUPRAVENTRICULAR TACHYCARDIA (H): Primary | ICD-10-CM

## 2023-01-13 RX ORDER — FENTANYL CITRATE 50 UG/ML
25 INJECTION, SOLUTION INTRAMUSCULAR; INTRAVENOUS
Status: CANCELLED | OUTPATIENT
Start: 2023-01-13

## 2023-01-13 RX ORDER — LIDOCAINE 40 MG/G
CREAM TOPICAL
Status: CANCELLED | OUTPATIENT
Start: 2023-01-13

## 2023-01-13 RX ORDER — SODIUM CHLORIDE 9 MG/ML
100 INJECTION, SOLUTION INTRAVENOUS CONTINUOUS
Status: CANCELLED | OUTPATIENT
Start: 2023-01-27

## 2023-01-19 ENCOUNTER — OFFICE VISIT (OUTPATIENT)
Dept: FAMILY MEDICINE | Facility: CLINIC | Age: 23
End: 2023-01-19
Payer: COMMERCIAL

## 2023-01-19 VITALS
WEIGHT: 151.4 LBS | HEIGHT: 73 IN | DIASTOLIC BLOOD PRESSURE: 78 MMHG | SYSTOLIC BLOOD PRESSURE: 92 MMHG | HEART RATE: 51 BPM | TEMPERATURE: 97 F | RESPIRATION RATE: 15 BRPM | BODY MASS INDEX: 20.06 KG/M2 | OXYGEN SATURATION: 96 %

## 2023-01-19 DIAGNOSIS — I47.10 SVT (SUPRAVENTRICULAR TACHYCARDIA) (H): ICD-10-CM

## 2023-01-19 DIAGNOSIS — Z01.818 PREOP GENERAL PHYSICAL EXAM: Primary | ICD-10-CM

## 2023-01-19 LAB
ANION GAP SERPL CALCULATED.3IONS-SCNC: 8 MMOL/L (ref 7–15)
BASOPHILS # BLD AUTO: 0 10E3/UL (ref 0–0.2)
BASOPHILS NFR BLD AUTO: 1 %
BUN SERPL-MCNC: 14.4 MG/DL (ref 6–20)
CALCIUM SERPL-MCNC: 9.8 MG/DL (ref 8.6–10)
CHLORIDE SERPL-SCNC: 104 MMOL/L (ref 98–107)
CREAT SERPL-MCNC: 0.97 MG/DL (ref 0.67–1.17)
DEPRECATED HCO3 PLAS-SCNC: 27 MMOL/L (ref 22–29)
EOSINOPHIL # BLD AUTO: 0.1 10E3/UL (ref 0–0.7)
EOSINOPHIL NFR BLD AUTO: 2 %
ERYTHROCYTE [DISTWIDTH] IN BLOOD BY AUTOMATED COUNT: 12.9 % (ref 10–15)
GFR SERPL CREATININE-BSD FRML MDRD: >90 ML/MIN/1.73M2
GLUCOSE SERPL-MCNC: 103 MG/DL (ref 70–99)
HCT VFR BLD AUTO: 42.7 % (ref 40–53)
HGB BLD-MCNC: 15 G/DL (ref 13.3–17.7)
LYMPHOCYTES # BLD AUTO: 2.7 10E3/UL (ref 0.8–5.3)
LYMPHOCYTES NFR BLD AUTO: 50 %
MCH RBC QN AUTO: 29.2 PG (ref 26.5–33)
MCHC RBC AUTO-ENTMCNC: 35.1 G/DL (ref 31.5–36.5)
MCV RBC AUTO: 83 FL (ref 78–100)
MONOCYTES # BLD AUTO: 0.3 10E3/UL (ref 0–1.3)
MONOCYTES NFR BLD AUTO: 6 %
NEUTROPHILS # BLD AUTO: 2.3 10E3/UL (ref 1.6–8.3)
NEUTROPHILS NFR BLD AUTO: 42 %
PLATELET # BLD AUTO: 262 10E3/UL (ref 150–450)
POTASSIUM SERPL-SCNC: 4.8 MMOL/L (ref 3.4–5.3)
RBC # BLD AUTO: 5.14 10E6/UL (ref 4.4–5.9)
SODIUM SERPL-SCNC: 139 MMOL/L (ref 136–145)
WBC # BLD AUTO: 5.4 10E3/UL (ref 4–11)

## 2023-01-19 PROCEDURE — 36415 COLL VENOUS BLD VENIPUNCTURE: CPT | Performed by: NURSE PRACTITIONER

## 2023-01-19 PROCEDURE — 85025 COMPLETE CBC W/AUTO DIFF WBC: CPT | Performed by: NURSE PRACTITIONER

## 2023-01-19 PROCEDURE — 99214 OFFICE O/P EST MOD 30 MIN: CPT | Performed by: NURSE PRACTITIONER

## 2023-01-19 PROCEDURE — 80048 BASIC METABOLIC PNL TOTAL CA: CPT | Performed by: NURSE PRACTITIONER

## 2023-01-19 ASSESSMENT — PAIN SCALES - GENERAL: PAINLEVEL: NO PAIN (0)

## 2023-01-19 NOTE — PROGRESS NOTES
[facility]  [address] [facility] [address]  Phone: [phone]  Primary Provider: Dallas Leroy  Pre-op Performing Provider: LOYD KHANNA       :[id number]}  PREOPERATIVE EVALUATION:  Today's date: 1/19/2023    Brenda Rizzo is a 22 year old male who presents for a preoperative evaluation.    Surgical Information:  Surgery/Procedure: Ablation Supraventricular Tachycardia  Surgery Location: [facility]  Surgeon:  Antoine Ruiz,     Surgery Date: 01/27/2023  Time of Surgery: 11:30 AM  Where patient plans to recover: At home with family  Fax number for surgical facility: Note does not need to be faxed, will be available electronically in Epic.    Type of Anesthesia Anticipated: Local with MAC    Assessment & Plan     The proposed surgical procedure is considered INTERMEDIATE risk.    Preop general physical exam    - CBC with platelets and differential; Future  - Basic metabolic panel  (Ca, Cl, CO2, Creat, Gluc, K, Na, BUN); Future  - CBC with platelets and differential  - Basic metabolic panel  (Ca, Cl, CO2, Creat, Gluc, K, Na, BUN)    SVT (supraventricular tachycardia) (H)  -patient is cleared for ablation procedure   - CBC with platelets and differential; Future  - Basic metabolic panel  (Ca, Cl, CO2, Creat, Gluc, K, Na, BUN); Future  - CBC with platelets and differential  - Basic metabolic panel  (Ca, Cl, CO2, Creat, Gluc, K, Na, BUN)         Risks and Recommendations:  The patient has the following additional risks and recommendations for perioperative complications:   - No identified additional risk factors other than previously addressed    Medication Instructions:  Patient is to take all scheduled medications on the day of surgery    RECOMMENDATION:  APPROVAL GIVEN to proceed with proposed procedure, without further diagnostic evaluation.    [id number]}    Subjective     HPI related to upcoming procedure: history of SVT    Preop  Questions 1/19/2023   1. Have you ever had a heart attack or stroke? No   2. Have you ever had surgery on your heart or blood vessels, such as a stent placement, a coronary artery bypass, or surgery on an artery in your head, neck, heart, or legs? No   3. Do you have chest pain with activity? No, feeling well today, denies palpitations    4. Do you have a history of  heart failure? No   5. Do you currently have a cold, bronchitis or symptoms of other infection? No   6. Do you have a cough, shortness of breath, or wheezing? No   7. Do you or anyone in your family have previous history of blood clots? UNKNOWN   8. Do you or does anyone in your family have a serious bleeding problem such as prolonged bleeding following surgeries or cuts? UNKNOWN    9. Have you ever had problems with anemia or been told to take iron pills? No   10. Have you had any abnormal blood loss such as black, tarry or bloody stools? No   11. Have you ever had a blood transfusion? No   12. Are you willing to have a blood transfusion if it is medically needed before, during, or after your surgery? Yes   13. Have you or any of your relatives ever had problems with anesthesia? UNKNOWN   14. Do you have sleep apnea, excessive snoring or daytime drowsiness? No   15. Do you have any artifical heart valves or other implanted medical devices like a pacemaker, defibrillator, or continuous glucose monitor? No   16. Do you have artificial joints? No   17. Are you allergic to latex? No         Preoperative Review of :   reviewed - no record of controlled substances prescribed.      Status of Chronic Conditions:  See problem list for active medical problems.  Problems all longstanding and stable, except as noted/documented.  See ROS for pertinent symptoms related to these conditions.      Review of Systems  Constitutional, neuro, ENT, endocrine, pulmonary, cardiac, gastrointestinal, genitourinary, musculoskeletal, integument and psychiatric systems are  "negative, except as otherwise noted.    Patient Active Problem List    Diagnosis Date Noted     Anxiety and depression 11/26/2018     Priority: Medium     Supraventricular tachycardia (H) 06/04/2018     Priority: Medium     Palpitations 06/04/2018     Priority: Medium     Anxiety 06/04/2018     Priority: Medium      Past Medical History:   Diagnosis Date     Unspecified part of open fracture of clavicle 10/08/2004     Past Surgical History:   Procedure Laterality Date     SURGICAL HISTORY OF -   2002    penoscrotal web repair     Current Outpatient Medications   Medication Sig Dispense Refill     escitalopram (LEXAPRO) 10 MG tablet Take 1 tablet (10 mg) by mouth daily 90 tablet 3       No Known Allergies     Social History     Tobacco Use     Smoking status: Every Day     Types: Vaping Device     Smokeless tobacco: Never   Substance Use Topics     Alcohol use: Yes     Comment: social       History   Drug Use Unknown     Comment: daily         Objective     BP 92/78 (BP Location: Right arm, Patient Position: Sitting, Cuff Size: Adult Regular)   Pulse 51   Temp 97  F (36.1  C) (Tympanic)   Resp 15   Ht 1.855 m (6' 1.03\")   Wt 68.7 kg (151 lb 6.4 oz)   SpO2 96%   BMI 19.96 kg/m      Physical Exam    GENERAL APPEARANCE: healthy, alert and no distress     EYES: EOMI,  PERRL     HENT: ear canals and TM's normal and nose and mouth without ulcers or lesions     NECK: no adenopathy, no asymmetry, masses, or scars and thyroid normal to palpation     RESP: lungs clear to auscultation - no rales, rhonchi or wheezes     CV: regular rates and rhythm, normal S1 S2, no S3 or S4 and no murmur, click or rub     MS: extremities normal- no gross deformities noted, no evidence of inflammation in joints, FROM in all extremities.     SKIN: no suspicious lesions or rashes     NEURO: Normal strength and tone, sensory exam grossly normal, mentation intact and speech normal     PSYCH: mentation appears normal. and affect " normal/bright     LYMPHATICS: No cervical adenopathy    Recent Labs   Lab Test 09/15/21  1444   HGB 15.2         POTASSIUM 4.0   CR 0.89        Diagnostics:  Labs pending at this time.  Results will be reviewed when available.  Recent Results (from the past 24 hour(s))   CBC with platelets and differential    Collection Time: 01/19/23 10:34 AM   Result Value Ref Range    WBC Count 5.4 4.0 - 11.0 10e3/uL    RBC Count 5.14 4.40 - 5.90 10e6/uL    Hemoglobin 15.0 13.3 - 17.7 g/dL    Hematocrit 42.7 40.0 - 53.0 %    MCV 83 78 - 100 fL    MCH 29.2 26.5 - 33.0 pg    MCHC 35.1 31.5 - 36.5 g/dL    RDW 12.9 10.0 - 15.0 %    Platelet Count 262 150 - 450 10e3/uL    % Neutrophils 42 %    % Lymphocytes 50 %    % Monocytes 6 %    % Eosinophils 2 %    % Basophils 1 %    Absolute Neutrophils 2.3 1.6 - 8.3 10e3/uL    Absolute Lymphocytes 2.7 0.8 - 5.3 10e3/uL    Absolute Monocytes 0.3 0.0 - 1.3 10e3/uL    Absolute Eosinophils 0.1 0.0 - 0.7 10e3/uL    Absolute Basophils 0.0 0.0 - 0.2 10e3/uL      No EKG required, no history of coronary heart disease, significant arrhythmia, peripheral arterial disease or other structural heart disease.    Revised Cardiac Risk Index (RCRI):  The patient has the following serious cardiovascular risks for perioperative complications:   - No serious cardiac risks = 0 points     RCRI Interpretation: 0 points: Class I (very low risk - 0.4% complication rate)           Signed Electronically by: RAISA Moody CNP  Copy of this evaluation report is provided to requesting physician.

## 2023-01-20 ENCOUNTER — TELEPHONE (OUTPATIENT)
Dept: CARDIOLOGY | Facility: CLINIC | Age: 23
End: 2023-01-20
Payer: COMMERCIAL

## 2023-01-20 NOTE — CONFIDENTIAL NOTE
Pre-Procedure Education    Procedure: SVT Abaltion with Dr Ruiz on 1/27/23 with arrival time 7:00 am    COVID: COVID policy- if pt develops COVID like symptoms prior to procedure, he/she would need to complete an at home with a rapid antigen COVID test 1-2 days prior to your procedure date. If COVID + pt is aware the procedure will need to be rescheduled, and to contact CV scheduling as soon as possible    Pre-Op H&P: Completed- Available in Epic 1/19/23    Education:   Attempted to contact pt via phone, VM was left with request that pt return call to review above/below information  Pre-Procedure Instruction: NPO after midnight pre procedure, Defined NPO with pt, Remove all jewelry and leave all valuables at home, Shower prior to arrival, Sedation plan/orders, Transportation requirements and arrangements post procedure, Post-procedure follow up process, Post-procedure restrictions/expectations, and Pre-procedure letter sent- letter tab  Risks Reviewed:   Cardiac Catheter Ablation    <1% risk for the following: hypotension, hemorrhage, vascular injury including perforation of vein, artery or heart, thrombophlebitis, systemic or pulmonic emboli; cardiac perforation, (tamponade), infection, pneumothorax, arrhythmias, proarrhythmic effects of drugs, radiation exposure.    1-2% complete heart block (for AVNRT or septol accessory pathway).    <0.5% CVA or MI    <0.1% death    If external defibrillation is needed, 75% risk for superficial burn.    1-2% tamponade and aortic puncture with left sided transeptal approach for left side ANJELICA - increase risk of CVA to <2%.    Late arrhythmia recurrences depends upon the primary rhythm disturbance.      Medication:   Instructions regarding anticoagulants: Pt not on AC- N/A  Instructions regarding antiarrhythmic medication: None; N/A  Instructions for medication, other than anticoagulants and antiarrhythmics listed above, given to pt: to take all morning medications with small  sips of water, with the exception of OTC supplements and MVI     Important patient information for staff: None    1/20/2023 8:26 AM  Unique Whiet RN

## 2023-01-23 NOTE — TELEPHONE ENCOUNTER
2nd Attempt to contact pt, voicemail message was left with contact information and instructing pt to call back.  1/23/2023 11:56 AM  Unique Ma RN

## 2023-01-25 NOTE — TELEPHONE ENCOUNTER
3rd attempt to call patient.  No answer, left detailed message with instructions noted below.  No medication changes, H&P complete, instructed him to have a  to bring him home.  Reviewed contact information for any questions.

## 2023-01-26 ENCOUNTER — ANESTHESIA EVENT (OUTPATIENT)
Dept: ADMINISTRATIVE | Facility: CLINIC | Age: 23
End: 2023-01-26
Payer: COMMERCIAL

## 2023-01-26 RX ORDER — SODIUM CHLORIDE, SODIUM LACTATE, POTASSIUM CHLORIDE, CALCIUM CHLORIDE 600; 310; 30; 20 MG/100ML; MG/100ML; MG/100ML; MG/100ML
INJECTION, SOLUTION INTRAVENOUS CONTINUOUS
Status: CANCELLED | OUTPATIENT
Start: 2023-01-27

## 2023-01-26 RX ORDER — LIDOCAINE 40 MG/G
CREAM TOPICAL
Status: CANCELLED | OUTPATIENT
Start: 2023-01-26

## 2023-01-27 ENCOUNTER — ANESTHESIA (OUTPATIENT)
Dept: ADMINISTRATIVE | Facility: CLINIC | Age: 23
End: 2023-01-27
Payer: COMMERCIAL

## 2023-01-30 ENCOUNTER — DOCUMENTATION ONLY (OUTPATIENT)
Dept: CARDIOLOGY | Facility: CLINIC | Age: 23
End: 2023-01-30

## 2023-01-30 NOTE — PROGRESS NOTES
Pt did not show to his SVT ablation with Dr. Ruiz on 1-27-23.  His post op telephone call was cancelled.  Pt was called X3 for instructions prior to his procedure with no call back.

## 2023-03-21 ENCOUNTER — HOSPITAL ENCOUNTER (EMERGENCY)
Facility: CLINIC | Age: 23
Discharge: HOME | End: 2023-03-21
Attending: EMERGENCY MEDICINE | Admitting: EMERGENCY MEDICINE
Payer: COMMERCIAL

## 2023-03-21 VITALS
DIASTOLIC BLOOD PRESSURE: 76 MMHG | WEIGHT: 180 LBS | RESPIRATION RATE: 18 BRPM | TEMPERATURE: 99.8 F | HEART RATE: 56 BPM | HEIGHT: 74 IN | SYSTOLIC BLOOD PRESSURE: 126 MMHG | OXYGEN SATURATION: 98 % | BODY MASS INDEX: 23.1 KG/M2

## 2023-03-21 DIAGNOSIS — F41.8 DEPRESSION WITH ANXIETY: ICD-10-CM

## 2023-03-21 PROCEDURE — 99284 EMERGENCY DEPT VISIT MOD MDM: CPT | Performed by: EMERGENCY MEDICINE

## 2023-03-21 PROCEDURE — 90791 PSYCH DIAGNOSTIC EVALUATION: CPT

## 2023-03-21 PROCEDURE — 99285 EMERGENCY DEPT VISIT HI MDM: CPT | Mod: 25 | Performed by: EMERGENCY MEDICINE

## 2023-03-21 RX ORDER — HYDROXYZINE HYDROCHLORIDE 25 MG/1
25 TABLET, FILM COATED ORAL 3 TIMES DAILY PRN
Qty: 30 TABLET | Refills: 0 | Status: SHIPPED | OUTPATIENT
Start: 2023-03-21 | End: 2023-10-09

## 2023-03-21 ASSESSMENT — ACTIVITIES OF DAILY LIVING (ADL)
ADLS_ACUITY_SCORE: 35
ADLS_ACUITY_SCORE: 35

## 2023-03-21 ASSESSMENT — COLUMBIA-SUICIDE SEVERITY RATING SCALE - C-SSRS
6. HAVE YOU EVER DONE ANYTHING, STARTED TO DO ANYTHING, OR PREPARED TO DO ANYTHING TO END YOUR LIFE?: NO
ATTEMPT LIFETIME: NO
REASONS FOR IDEATION LIFETIME: DOES NOT APPLY
2. HAVE YOU ACTUALLY HAD ANY THOUGHTS OF KILLING YOURSELF?: NO
1. IN THE PAST MONTH, HAVE YOU WISHED YOU WERE DEAD OR WISHED YOU COULD GO TO SLEEP AND NOT WAKE UP?: YES
REASONS FOR IDEATION PAST MONTH: DOES NOT APPLY
1. HAVE YOU WISHED YOU WERE DEAD OR WISHED YOU COULD GO TO SLEEP AND NOT WAKE UP?: YES
TOTAL  NUMBER OF ABORTED OR SELF INTERRUPTED ATTEMPTS LIFETIME: NO
TOTAL  NUMBER OF INTERRUPTED ATTEMPTS LIFETIME: NO

## 2023-03-21 ASSESSMENT — ENCOUNTER SYMPTOMS
FEVER: 0
COUGH: 0
SHORTNESS OF BREATH: 0
APPETITE CHANGE: 0
FATIGUE: 0
CHEST TIGHTNESS: 0
NERVOUS/ANXIOUS: 1
BACK PAIN: 0
ABDOMINAL PAIN: 0
HEADACHES: 0
DYSPHORIC MOOD: 0
CHILLS: 0
LIGHT-HEADEDNESS: 0

## 2023-03-21 NOTE — Clinical Note
Brenda Rizzo was seen and treated in our emergency department on 3/21/2023.  He may return to work on 03/22/2023.       If you have any questions or concerns, please don't hesitate to call.      lAexys Reeder MD

## 2023-03-21 NOTE — DISCHARGE INSTRUCTIONS
"Aftercare Plan  If I am feeling unsafe or I am in a crisis, I will:   Contact my established care providers   Call the National Suicide Prevention Lifeline: 988  Go to the nearest emergency room   Call 911   [facility] Crisis Line Number: [phone]     Today you were seen by a licensed mental health professional through Triage and Transition services, [facility] ([facility])  for a crisis assessment in the Emergency Department at [facility].  It is recommended that you follow up with your established providers (psychiatrist, mental health therapist, and/or primary care doctor - as relevant) as soon as possible.     Coordinators from [facility] will be calling you in the next 24-48 hours to ensure that you have the resources you need.  You can also contact [facility] coordinators directly at [phone]. You may have been scheduled for or offered an appointment with a mental health provider. [facility] maintains an extensive network of licensed behavioral health providers to connect patients with the services they need.  We do not charge providers a fee to participate in our referral network.  We match patients with providers based on a patient's specific needs, insurance coverage, and location.  Our first effort will be to refer you to a provider within your care system, and will utilize providers outside your care system as needed.      Warning signs that I or other people might notice when a crisis is developing for me: worrying about future, what may happen, what's \"normal,\" getting physical symptoms of anxiety; losing interest in or energy for things that usually bring me enjoyment    Things I am able to do on my own to cope or help me feel better:      Things that I am able to do with others to cope or help me better:  [employer] [place] [employer]ness ([place])  172.062.9021 or 1.888.KENDRICK.HELPS (There is also a [place] Chapter-- they have no cost resources, to include on line support for those who have " "lost important people in their lives-- I can give them a call, either local of [place] chapter and check in out)     Things I can use or do for distraction:  Check out YouTube, \"Short, Free headspace meditations,\"  or \"short free mindfulness meditations.\"   [place] to starting to quiet \"fast minds\"/anxiety etc  Check out \"fast minds\" podcast and similar resources.      Changes I can make to support my mental health and wellness: Check out resources, explore supports in counseling, prescribed medication, meditation, free support groups, web searches on ways to manage anxiety, loss, occasional interpersonal disagreements.  Allow [facility] and others to help and support as they would like to.     Your [facility] has a mental health crisis team you can call 24/7: [facility] Crisis  [phone]    Other things that are important when I'm in crisis: focus on breathing when having first signs of anxiety/worry; many if not most people have thoughts and feelings and may sometimes need some support in how to manage these.    I deserve to feel better and to be as happy as is possible as often as is possible (within reason, of course).   I am a likeable good and more than just acceptable human being and I can give myself the same breaks I would give others      Crisis Lines  Crisis Text Line  Text 999146  You will be connected with a trained live crisis counselor to provide support.    Por kerry, texto  GUANAKO a 748490 o texto a 442-AYUDAME en WhatsApp    [employer] (LGBTQ Youth Crisis Line)  0.467.545.7169  text START to 651-531      Community Resources  Fast Tracker  Linking people to mental health and substance use disorder resources  fasttrackermn.org     [place] Mental Health Warm Line  Peer to peer support  Monday thru Saturday, 12 pm to 10 pm  209.025.2683 or 4.509.878.5954  Text \"Support\" to 57953    National [place] [employer] Mental Illness ([place])  819.746.4747 or 1.888.KENDRICK.HELPS (there is a [place] " Chapter-- call them anytime)     Mental Health Apps  My3  https://myZiplooppp.org/    VirtualHopeBox  https://BuildFax.FantasyBook/apps/virtual-hope-box/
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
## 2023-03-21 NOTE — ED PROVIDER NOTES
History     Chief Complaint   Patient presents with     Suicidal     Patient arrives by EMS for suicidal thoughts- on a hold by [facility]     HPI  Brenda Rizzo is a 22 year old male with history of anxiety and depression presenting for evaluation of increased anxiety.  Patient has had some social stress with family and a young child.  Currently without suicidal thoughts.    Allergies:  No Known Allergies    Problem List:    Patient Active Problem List    Diagnosis Date Noted     Anxiety and depression 11/26/2018     Priority: Medium     Supraventricular tachycardia (H) 06/04/2018     Priority: Medium     Palpitations 06/04/2018     Priority: Medium     Anxiety 06/04/2018     Priority: Medium        Past Medical History:    Past Medical History:   Diagnosis Date     Unspecified part of open fracture of clavicle 10/08/2004       Past Surgical History:    Past Surgical History:   Procedure Laterality Date     SURGICAL HISTORY OF -   2002    penoscrotal web repair       Family History:    Family History   Problem Relation Age of Onset     Hypertension Maternal Grandmother      Depression Maternal Grandmother        Social History:  Marital Status:  Single [1]  Social History     Tobacco Use     Smoking status: Every Day     Types: Vaping Device     Smokeless tobacco: Never   Vaping Use     Vaping Use: Every day     Substances: Nicotine     Devices: iPosi tank   Substance Use Topics     Alcohol use: Yes     Comment: social     Drug use: Not Currently     Types: Marijuana     Comment: daily        Medications:    hydrOXYzine (ATARAX) 25 MG tablet  escitalopram (LEXAPRO) 10 MG tablet          Review of Systems   Constitutional: Negative for appetite change, chills, fatigue and fever.   HENT: Negative for congestion.    Respiratory: Negative for cough, chest tightness and shortness of breath.    Cardiovascular: Negative for chest pain.   Gastrointestinal: Negative for abdominal pain.   Musculoskeletal: Negative  "for back pain.   Neurological: Negative for light-headedness and headaches.   Psychiatric/Behavioral: Negative for dysphoric mood and suicidal ideas. The patient is nervous/anxious.    All other systems reviewed and are negative.      Physical Exam   BP: 135/86  Pulse: 74  Temp: 99.8  F (37.7  C)  Resp: 18  Height: 188 cm (6' 2\")  Weight: 81.6 kg (180 lb)  SpO2: 96 %      Physical Exam  Vitals and nursing note reviewed.   Constitutional:       Appearance: Normal appearance. He is not ill-appearing or diaphoretic.      Comments: Patient awake and alert, calm and cooperative in no distress.  Provides a full history easily   HENT:      Head: Atraumatic.      Nose: Nose normal.      Mouth/Throat:      Mouth: Mucous membranes are moist.   Eyes:      Conjunctiva/sclera: Conjunctivae normal.   Cardiovascular:      Rate and Rhythm: Normal rate.      Pulses: Normal pulses.   Pulmonary:      Effort: Pulmonary effort is normal.   Skin:     General: Skin is warm and dry.      Capillary Refill: Capillary refill takes less than 2 seconds.   Neurological:      Mental Status: He is alert and oriented to person, place, and time.   Psychiatric:         Attention and Perception: Attention normal.         Mood and Affect: Mood is anxious (Mild currently).         Speech: Speech normal.         Behavior: Behavior is cooperative.         Thought Content: Thought content does not include suicidal ideation. Thought content does not include suicidal plan.         Cognition and Memory: Cognition normal.         ED Course                 Procedures                No results found for this or any previous visit (from the past 24 hour(s)).    Medications - No data to display    6:33 AM: Discussed with Kiesha FERGUSON. Safe for discharge. Has poorly controlled anxiety.     Assessments & Plan (with Medical Decision Making)  22-year-old with history of anxiety presenting for evaluation of some depressive thoughts with fleeting thoughts of suicide.  " Calm and cooperative here in no distress.  No longer suicidal and able to make a safety plan with DEC .  We will have some follow-up with therapy coordinated.  Discharged home in stable condition.  We will provide a short course of hydroxyzine for anxiety relief as needed.  Patient advised to return if he has any worsening symptoms or concerns.     I have reviewed the nursing notes.    I have reviewed the findings, diagnosis, plan and need for follow up with the patient.             New Prescriptions    HYDROXYZINE (ATARAX) 25 MG TABLET    Take 1 tablet (25 mg) by mouth 3 times daily as needed for anxiety       Final diagnoses:   Depression with anxiety       3/21/2023   [facility] EMERGENCY DEPT     Reeder, Alexys Osorio MD  03/21/23 [id number]
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
## 2023-03-22 ENCOUNTER — PATIENT OUTREACH (OUTPATIENT)
Dept: FAMILY MEDICINE | Facility: CLINIC | Age: 23
End: 2023-03-22
Payer: COMMERCIAL

## 2023-03-22 ENCOUNTER — TELEPHONE (OUTPATIENT)
Dept: BEHAVIORAL HEALTH | Facility: CLINIC | Age: 23
End: 2023-03-22
Payer: COMMERCIAL

## 2023-03-22 NOTE — TELEPHONE ENCOUNTER
[facility]  No data recorded      Social Work Care Navigator Note      Patient: Brenda Rizzo  Date: March 22, 2023  Preferred Name: Brenda    Previous PHQ-9:   PHQ-9 SCORE 8/23/2022 1/9/2023 1/9/2023   PHQ-9 Total Score MyChart 15 (Moderately severe depression) - 16 (Moderately severe depression)   PHQ-9 Total Score 15 16 16     Previous PETROS-7:   PETROS-7 SCORE 10/11/2021 12/27/2021 3/16/2022   Total Score 16 10 6     JENNIFER LEVEL:  No flowsheet data found.    Preferred Contact:  No data recorded    Type of Contact Today: Phone call (not reached/unavailable)         Data: (subjective / Objective):  [facility] called patient to provide information on [facility] services and to discuss enrollment.  NO answer and voice mail was full so was not able to leave a message.  [facility] sent patient MyChart message and included [facility]'s contact information.  [facility] also sent message to patient's PCP to update on patient's eligibility for [facility].    Ilsa Mcdonald, [facility]  [facility] ([facility])   [facility]  [phone]

## 2023-03-22 NOTE — TELEPHONE ENCOUNTER
"ED/Discharge Protocol    \"Hi, my name is Olinda Mcdonald RN, a registered nurse, and I am calling on behalf of Dr. greer's office at [facility].  I am calling to follow up and see how things are going for you after your recent visit.\"    \"I see that you were in the (ER/UC/IP) on ER 3/21 for depression & anxiety.    How are you doing now that you are home?\" feeling a lot better. \"Feel there was a very unfortunate series of events that brought me to where I was\". No current thoughts of suicide.    Is patient experiencing symptoms that may require a hospital visit?  no    Discharge Instructions    \"Let's review your discharge instructions.  What is/are the follow-up recommendations?  Pt. Response: start hydroxyzine PRN & call back if has concerns. Hasn't taken yet. Doesn't feel like he needs it right now.    \"Were you instructed to make a follow-up appointment?\"  Pt. Response: No.       \"When you see the provider, I would recommend that you bring your discharge instructions with you.    Medications    \"How many new medications are you on since your hospitalization/ED visit?\"    1 med-hydroxyzine-has used this in the past.  \"How many of your current medicines changed (dose, timing, name, etc.) while you were in the hospital/ED visit?\"   0  \"Do you have questions about your medications?\"   No  \"Post Discharge Medication Reconciliation Status: discharge medications reconciled, continue medications without change.    Was MTM referral placed (*Make sure to put transitions as reason for referral)?   No    Call Summary    \"Do you have any questions or concerns about your condition or care plan at the moment?\"    No  Triage nurse advice given: pt to view mychart & will contact Ilsa MONTENEGRO [facility]  This nurse to contact Ilsa MONTENEGRO to update that spoke with pt.    Patient was in ER/UC x10 in the past year (assess appropriateness of ER visits.)      \"If you have questions or things don't continue to improve, we encourage you contact us " "through the main clinic number,  [phone].  Even if the clinic is not open, triage nurses are available 24/7 to help you.     We would like you to know that our clinic has extended hours (provide information).  We also have urgent care (provide details on closest location and hours/contact info)\"      \"Thank you for your time and take care!\"        "

## 2023-05-30 DIAGNOSIS — F32.A ANXIETY AND DEPRESSION: ICD-10-CM

## 2023-05-30 DIAGNOSIS — F41.9 ANXIETY AND DEPRESSION: ICD-10-CM

## 2023-05-31 ENCOUNTER — MYC MEDICAL ADVICE (OUTPATIENT)
Dept: FAMILY MEDICINE | Facility: CLINIC | Age: 23
End: 2023-05-31
Payer: COMMERCIAL

## 2023-06-01 ENCOUNTER — HOSPITAL ENCOUNTER (EMERGENCY)
Facility: CLINIC | Age: 23
Discharge: HOME | End: 2023-06-01
Attending: EMERGENCY MEDICINE | Admitting: EMERGENCY MEDICINE
Payer: OTHER MISCELLANEOUS

## 2023-06-01 VITALS
OXYGEN SATURATION: 98 % | DIASTOLIC BLOOD PRESSURE: 80 MMHG | SYSTOLIC BLOOD PRESSURE: 114 MMHG | TEMPERATURE: 97.2 F | HEART RATE: 66 BPM | WEIGHT: 175 LBS | RESPIRATION RATE: 16 BRPM | BODY MASS INDEX: 22.47 KG/M2

## 2023-06-01 DIAGNOSIS — S61.211A LACERATION OF LEFT INDEX FINGER WITHOUT FOREIGN BODY WITHOUT DAMAGE TO NAIL, INITIAL ENCOUNTER: ICD-10-CM

## 2023-06-01 PROCEDURE — 12001 RPR S/N/AX/GEN/TRNK 2.5CM/<: CPT | Performed by: EMERGENCY MEDICINE

## 2023-06-01 PROCEDURE — 99283 EMERGENCY DEPT VISIT LOW MDM: CPT | Performed by: EMERGENCY MEDICINE

## 2023-06-01 PROCEDURE — 250N000011 HC RX IP 250 OP 636: Performed by: EMERGENCY MEDICINE

## 2023-06-01 PROCEDURE — 99283 EMERGENCY DEPT VISIT LOW MDM: CPT | Mod: 25 | Performed by: EMERGENCY MEDICINE

## 2023-06-01 RX ORDER — ESCITALOPRAM OXALATE 10 MG/1
10 TABLET ORAL DAILY
Qty: 90 TABLET | Refills: 0 | Status: SHIPPED | OUTPATIENT
Start: 2023-06-01 | End: 2023-10-09

## 2023-06-01 RX ORDER — ONDANSETRON 4 MG/1
4 TABLET, ORALLY DISINTEGRATING ORAL ONCE
Status: COMPLETED | OUTPATIENT
Start: 2023-06-01 | End: 2023-06-01

## 2023-06-01 RX ADMIN — ONDANSETRON 4 MG: 4 TABLET, ORALLY DISINTEGRATING ORAL at 17:41

## 2023-06-01 ASSESSMENT — ACTIVITIES OF DAILY LIVING (ADL): ADLS_ACUITY_SCORE: 35

## 2023-06-01 NOTE — TELEPHONE ENCOUNTER
Pending Prescriptions:                       Disp   Refills    escitalopram (LEXAPRO) 10 MG tablet [Phar*90 tab*3            Sig: TAKE 1 TABLET(10 MG) BY MOUTH DAILY    Routing refill request to provider for review/approval because:  PHQ-9 score:      1/9/2023     1:17 AM   PHQ   PHQ-9 Total Score 16    16   Q9: Thoughts of better off dead/self-harm past 2 weeks Not at all    Not at all           Mike Blanton, RN

## 2023-06-01 NOTE — ED NOTES
Wound dressed with bacitracin, telfa and tube gauze. Tolerated well. Pt states feeling better after zofran, tolerated PO intake.

## 2023-06-01 NOTE — ED TRIAGE NOTES
Sliced left pointer finger open with a utility knife at work, has wrapped and is holding pressure     Triage Assessment     Row Name 06/01/23 2346       Triage Assessment (Adult)    Airway WDL WDL       Respiratory WDL    Respiratory WDL WDL       Peripheral/Neurovascular WDL    Peripheral Neurovascular WDL WDL       Cognitive/Neuro/Behavioral WDL    Cognitive/Neuro/Behavioral WDL WDL

## 2023-06-01 NOTE — ED PROVIDER NOTES
History     Chief Complaint   Patient presents with     Laceration     HPI  Brenda Rizzo is a 22 year old right-hand-dominant male who presents with laceration to index finger of left hand.  Was using a utility knife at work and accidentally cut the palmar surface of his hand near his PIP joint.  Patient believes that his tetanus is up-to-date.  No other injuries.  Was accidental.  Was in his usual state of health.  Not on anticoagulants.  No known immunocompromise state.    Chart review shows that most recent tetanus vaccination on 10/5/21    The patient's PMHx, Surgical Hx, Allergies, and Medications were all reviewed with the patient.    Allergies:  No Known Allergies    Problem List:    Patient Active Problem List    Diagnosis Date Noted     Anxiety and depression 11/26/2018     Priority: Medium     Supraventricular tachycardia (H) 06/04/2018     Priority: Medium     Palpitations 06/04/2018     Priority: Medium     Anxiety 06/04/2018     Priority: Medium        Past Medical History:    Past Medical History:   Diagnosis Date     Unspecified part of open fracture of clavicle 10/08/2004       Past Surgical History:    Past Surgical History:   Procedure Laterality Date     SURGICAL HISTORY OF -   2002    penoscrotal web repair       Family History:    Family History   Problem Relation Age of Onset     Hypertension Maternal Grandmother      Depression Maternal Grandmother        Social History:  Marital Status:  Single [1]  Social History     Tobacco Use     Smoking status: Every Day     Types: Vaping Device     Smokeless tobacco: Never   Vaping Use     Vaping status: Every Day     Substances: Nicotine     Devices: Refillable tank   Substance Use Topics     Alcohol use: Yes     Comment: social     Drug use: Not Currently     Types: Marijuana     Comment: daily        Medications:    escitalopram (LEXAPRO) 10 MG tablet  hydrOXYzine (ATARAX) 25 MG tablet          Review of Systems  Pertinent positives and negatives  mentioned in HPI    Physical Exam   BP: 109/61  Pulse: 82  Temp: 97.2  F (36.2  C)  Resp: 16  Weight: 79.4 kg (175 lb)  SpO2: 98 %    Physical Exam  GEN: Awake, alert, and cooperative. No acute distress.  HENT: Atraumatic  CV : Extremities warm and well perfused  PULM: Normal effort   NEURO: Normal speech. Following commands.  Answering questions and interacting appropriately.   EXT: 1.5 cm laceration over the volar PIP joint of left index finger.  Able to flex and extend against resistance and in isolation of MCP/DIP/PIP.  On evaluation I do not see any obvious deformity of tendon.  Decreased sensation on radial aspect distal to laceration.  INT: Warm. No diaphoresis. Normal color.                    ED Course        [facility]    -Laceration Repair    Date/Time: 6/1/2023 6:46 PM    Performed by: Jose Mitchell MD  Authorized by: Jose Mitchell MD    Risks, benefits and alternatives discussed.      ANESTHESIA (see MAR for exact dosages):     Anesthesia method:  Nerve block    Block needle gauge:  27 G    Block anesthetic:  Bupivacaine 0.25% w/o epi    Block technique:  Digital block    Block injection procedure:  Anatomic landmarks identified, anatomic landmarks palpated, introduced needle, incremental injection and negative aspiration for blood    Block outcome:  Anesthesia achieved      LACERATION DETAILS     Location:  Finger    Finger location:  L index finger    Length (cm):  2    Depth (mm):  2    REPAIR TYPE:     Repair type:  Simple      EXPLORATION:     Hemostasis achieved with:  Direct pressure    Wound exploration: wound explored through full range of motion and entire depth of wound probed and visualized      Contaminated: no      TREATMENT:     Area cleansed with:  Hibiclens    Irrigation solution:  Tap water    Visualized foreign bodies/material removed: no      SKIN REPAIR     Repair method:  Sutures    Suture size:  4-0    Suture material:  Nylon    Suture  technique:  Simple interrupted    Number of sutures:  4    APPROXIMATION     Approximation:  Close    POST-PROCEDURE DETAILS     Dressing:  Antibiotic ointment and bulky dressing        PROCEDURE    Patient Tolerance:  Patient tolerated the procedure well with no immediate complications         Critical Care time:  none               No results found for this or any previous visit (from the past 24 hour(s)).    Medications   ondansetron (ZOFRAN ODT) ODT tab 4 mg (4 mg Oral $Given 6/1/23 4061)       Assessments & Plan (with Medical Decision Making)   22 year old right-hand-dominant male with incidental laceration with utility knife to left index finger detailed above.  Tetanus is up-to-date.  Anesthesia with digital block.  Primary closure with four 4-0 nylon sutures.  Able to flex against resistance and low concern for tendon disruption.  Does have decreased sensation distal to injury on radial aspect of left index finger.  Wound care follow-up and ED return precautions discussed.  He expressed agreement understanding of plan and discharged in improved condition.    I have reviewed the nursing notes.         Discharge Medication List as of 6/1/2023  6:39 PM          Final diagnoses:   Laceration of left index finger without foreign body without damage to nail, initial encounter     Jose Mitchell MD    6/1/2023   [facility] EMERGENCY DEPT    Disclaimer: This note consists of words and symbols derived from keyboarding and dictation using voice recognition software.  As a result, there may be errors that have gone undetected.  Please consider this when interpreting information found in this note.             Jose Mitchell MD  06/01/23 9936

## 2023-06-01 NOTE — DISCHARGE INSTRUCTIONS
Please keep the wound clean and dry for 24-48 hours. The affected area should be kept elevated as much as possible.  After 24 hours, the dressing may be removed and the wound may be gently cleaned with warm water and soap.  You may apply petroleum based ointment as desired.  You should return in 7 days to your primary care physician or the emergency department for suture removal.       Any time the skin is damaged, scar formation is unavoidable. You can minimize the scar by following the above instructions and preventing infection. The scar will continue to evolve for at least 1 year. Final appearance of the scar will not be known until at least that time. Please apply sun screen to the scar before any sun exposure for the first year as sunburn or even tanning may cause the scar to become discolored and lead to poor cosmetic outcomes. If after 1 year, you are unhappy with the appearance of the scar you should seek follow-up with the appropriate health care professional to discuss further options.    You should return to the emergency department or your primary care physician immediately if you develop warmth, redness, swelling, drainage from the wound, or have fevers.

## 2023-06-02 ENCOUNTER — HEALTH MAINTENANCE LETTER (OUTPATIENT)
Age: 23
End: 2023-06-02

## 2023-07-22 ENCOUNTER — NURSE TRIAGE (OUTPATIENT)
Dept: NURSING | Facility: CLINIC | Age: 23
End: 2023-07-22
Payer: COMMERCIAL

## 2023-07-22 ENCOUNTER — VIRTUAL VISIT (OUTPATIENT)
Dept: URGENT CARE | Facility: CLINIC | Age: 23
End: 2023-07-22
Payer: COMMERCIAL

## 2023-07-22 DIAGNOSIS — G44.209 TENSION HEADACHE: ICD-10-CM

## 2023-07-22 DIAGNOSIS — Z72.820 SLEEP DEFICIENT: ICD-10-CM

## 2023-07-22 DIAGNOSIS — R00.2 PALPITATIONS: Primary | ICD-10-CM

## 2023-07-22 PROCEDURE — 99203 OFFICE O/P NEW LOW 30 MIN: CPT | Mod: VID

## 2023-07-22 NOTE — PATIENT INSTRUCTIONS
Letter for work done     If heart symptom recur, see cardiology as planned     Rest fluids   Tylenol or ibuprofen for headache if needed

## 2023-07-22 NOTE — LETTER
July 22, 2023      Brendailan Rizzo  [address] [facility] [address]        To Whom It May Concern:    Brenda T Kalenfady  was seen on 7/22/2023.  Please excuse him  until 7/23/2023 due to illness.        Sincerely,        [facility]

## 2023-07-22 NOTE — PROGRESS NOTES
Brenda is a 22 year old who is being evaluated via a billable video visit.      How would you like to obtain your AVS? MyChart  If the video visit is dropped, the invitation should be resent by: Text to cell phone: [phone]  Will anyone else be joining your video visit? No        Assessment & Plan     Palpitations  He has had history of high HR - 200's on a holter 2018 -more recent testing showed a less than minute 180  He was going  to get an ablation but he never scheduled and has not seen cardiology back   Discussed if he has persistent issues he should see cardiology again for evaluation     Sleep deprived   Had his son     When got up today felt weak heart racing and headache  Took a nap before the visit and feeling better   Still has a mild headache  He has not taken anything for it - does not like tylenol or NSAIDS. Trying tea and rest     He missed work today and basically needs a note for missed work which was done         (R00.2) Palpitations  (primary encounter diagnosis)  Comment: he has had issues in past   Plan: cardiology recheck if persistent   They resolved today    (Z72.820) Sleep deficient  Comment: nap helped   Plan: hopefully [facility] sleep will help     (G44.209) Tension headache  Comment: perfers no med  Plan: sleep, relaxing   Heat           40 minutes spent by me on the date of the encounter doing chart review, history and exam, documentation and further activities per the note       Nicotine/Tobacco Cessation:  He reports that he has been smoking vaping device. He has never used smokeless tobacco.  Nicotine/Tobacco Cessation Plan:   Information offered: Patient not interested at this time      Patient Instructions   Letter for work done     If heart symptom recur, see cardiology as planned     Rest fluids   Tylenol or ibuprofen for headache if needed       Return in about 4 weeks (around 8/19/2023) for if not improved .    Virtual Urgent Care  [facility] URGENT  JF Gutierrez is a 22 year old, presenting for the following health issues:  No chief complaint on file.         No data to display              HPI   Per triage note   Patient complains of palpitations. No other symptoms. History of PSVT but this does not feel the same. He is not faint and does not have SOB with exertion, able to count to 14 without taking a breath. No Pain. Recommended virtual visit and sent back to . [facility] if unable to get a visit.     Appointment note says chest/heart pain and weak     He had his son and is sleep deprved   Took a nap  And feels better   Earlier felt heat racing headache and weak     Needs note as he missed work     Review of Systems   Constitutional, HEENT, cardiovascular, pulmonary, GI, , musculoskeletal, neuro, skin, endocrine and psych systems are negative, except as otherwise noted.      Objective           Vitals:  No vitals were obtained today due to virtual visit.    Physical Exam   GENERAL: Healthy, alert and no distress  EYES: Eyes grossly normal to inspection.  No discharge or erythema, or obvious scleral/conjunctival abnormalities.  RESP: No audible wheeze, cough, or visible cyanosis.  No visible retractions or increased work of breathing.    SKIN: Visible skin clear. No significant rash, abnormal pigmentation or lesions.  NEURO: Cranial nerves grossly intact.  Mentation and speech appropriate for age.  PSYCH: Mentation appears normal, affect normal/bright, judgement and insight intact, normal speech and appearance well-groomed.            Video-Visit Details    Type of service:  Video Visit   Video Start Time: 5:25 PM  Video End Time:5:56 PM    Originating Location (pt. Location): Home  Distant Location (provider location):  Off-site  Platform used for Video Visit: [employer]

## 2023-07-22 NOTE — TELEPHONE ENCOUNTER
Patient complains of palpitations. No other symptoms. History of PSVT but this does not feel the same. He is not faint and does not have SOB with exertion, able to count to 14 without taking a breath. No Pain. Recommended virtual visit and sent back to . [facility] if unable to get a visit.     Reason for Disposition    [1] Palpitations AND [2] no improvement after using CARE ADVICE    Additional Information    Negative: Passed out (i.e., lost consciousness, collapsed and was not responding)    Negative: Shock suspected (e.g., cold/pale/clammy skin, too weak to stand, low BP, rapid pulse)    Negative: Difficult to awaken or acting confused (e.g., disoriented, slurred speech)    Negative: Visible sweat on face or sweat dripping down face    Negative: Unable to walk, or can only walk with assistance (e.g., requires support)    Negative: [1] Received SHOCK from implantable cardiac defibrillator AND [2] persisting symptoms (i.e., palpitations, lightheadedness)    Negative: [1] Dizziness, lightheadedness, or weakness AND [2] heart beating very rapidly (e.g., > 140 / minute)     Did not count    Negative: [1] Dizziness, lightheadedness, or weakness AND [2] heart beating very slowly (e.g., < 50 / minute)     Did not count    Negative: Difficulty breathing    Negative: Dizziness, lightheadedness, or weakness    Negative: [1] Heart beating very rapidly (e.g., > 140 / minute) AND [2] present now  (Exception: during exercise)    Negative: Heart beating very slowly (e.g., < 50 / minute)  (Exception: athlete and heart rate normal for caller)    Negative: New or worsened shortness of breath with activity (dyspnea on exertion)    Negative: Patient sounds very sick or weak to the triager    Negative: [1] Heart beating very rapidly (e.g., > 140 / minute) AND [2] not present now  (Exception: during exercise)    Negative: [1] Skipped or extra beat(s) AND [2] occurs 4 or more times per minute    Negative: New or worsened ankle  "swelling    Negative: [1] Skipped or extra beat(s) AND [2] increases with exercise or exertion    Negative: History of heart disease (i.e., heart attack, bypass surgery, angina, angioplasty, CHF) (Exception: brief heartbeat symptoms that went away and now feels well)    Negative: Age > 60 years (Exception: brief heartbeat symptoms that went away and now feels well)    Negative: Taking water pill (i.e., diuretic) or heart medication (e.g., digoxin)    Negative: Wearing a \"Holter monitor\" or \"cardiac event monitor\"    Negative: [1] Received SHOCK from implantable cardiac defibrillator AND [2] now feels well    Negative: Heart rhythm alert (e.g., \"you have irregular heartbeat\") from personal wearable device (e.g., Apple Watch)    Negative: History of hyperthyroidism or taking thyroid medication    Negative: Substance use (drug use) or misuse, known or suspected    Negative: [1] ADHD AND [2] taking stimulant medication    Protocols used: HEART RATE AND HEARTBEAT [id number]      "

## 2023-09-27 ENCOUNTER — TELEPHONE (OUTPATIENT)
Dept: FAMILY MEDICINE | Facility: CLINIC | Age: 23
End: 2023-09-27
Payer: COMMERCIAL

## 2023-09-27 DIAGNOSIS — F41.9 ANXIETY AND DEPRESSION: ICD-10-CM

## 2023-09-27 DIAGNOSIS — F32.A ANXIETY AND DEPRESSION: ICD-10-CM

## 2023-09-28 RX ORDER — ESCITALOPRAM OXALATE 10 MG/1
TABLET ORAL
Qty: 90 TABLET | Refills: 0 | OUTPATIENT
Start: 2023-09-28

## 2023-09-28 NOTE — TELEPHONE ENCOUNTER
Overdue for needed care. Please call to schedule. Once appt is scheduled, route back to the pool.    Julie Behrendt RN

## 2023-10-09 ENCOUNTER — VIRTUAL VISIT (OUTPATIENT)
Dept: FAMILY MEDICINE | Facility: CLINIC | Age: 23
End: 2023-10-09
Payer: COMMERCIAL

## 2023-10-09 DIAGNOSIS — F32.A ANXIETY AND DEPRESSION: ICD-10-CM

## 2023-10-09 DIAGNOSIS — F41.9 ANXIETY AND DEPRESSION: ICD-10-CM

## 2023-10-09 PROCEDURE — 99213 OFFICE O/P EST LOW 20 MIN: CPT | Mod: VID | Performed by: FAMILY MEDICINE

## 2023-10-09 RX ORDER — ESCITALOPRAM OXALATE 10 MG/1
10 TABLET ORAL DAILY
Qty: 90 TABLET | Refills: 0 | Status: SHIPPED | OUTPATIENT
Start: 2023-10-09 | End: 2024-01-05

## 2023-10-09 ASSESSMENT — ANXIETY QUESTIONNAIRES
6. BECOMING EASILY ANNOYED OR IRRITABLE: SEVERAL DAYS
2. NOT BEING ABLE TO STOP OR CONTROL WORRYING: SEVERAL DAYS
4. TROUBLE RELAXING: MORE THAN HALF THE DAYS
GAD7 TOTAL SCORE: 8
7. FEELING AFRAID AS IF SOMETHING AWFUL MIGHT HAPPEN: NOT AT ALL
5. BEING SO RESTLESS THAT IT IS HARD TO SIT STILL: SEVERAL DAYS
3. WORRYING TOO MUCH ABOUT DIFFERENT THINGS: MORE THAN HALF THE DAYS
IF YOU CHECKED OFF ANY PROBLEMS ON THIS QUESTIONNAIRE, HOW DIFFICULT HAVE THESE PROBLEMS MADE IT FOR YOU TO DO YOUR WORK, TAKE CARE OF THINGS AT HOME, OR GET ALONG WITH OTHER PEOPLE: SOMEWHAT DIFFICULT
1. FEELING NERVOUS, ANXIOUS, OR ON EDGE: SEVERAL DAYS
GAD7 TOTAL SCORE: 8

## 2023-10-09 ASSESSMENT — PATIENT HEALTH QUESTIONNAIRE - PHQ9
SUM OF ALL RESPONSES TO PHQ QUESTIONS 1-9: 11
SUM OF ALL RESPONSES TO PHQ QUESTIONS 1-9: 11
10. IF YOU CHECKED OFF ANY PROBLEMS, HOW DIFFICULT HAVE THESE PROBLEMS MADE IT FOR YOU TO DO YOUR WORK, TAKE CARE OF THINGS AT HOME, OR GET ALONG WITH OTHER PEOPLE: SOMEWHAT DIFFICULT

## 2023-10-09 ASSESSMENT — ENCOUNTER SYMPTOMS: NERVOUS/ANXIOUS: 1

## 2023-10-09 NOTE — PATIENT INSTRUCTIONS
You report to be doing much better.  Keep taking escitalopram 10 mg daily. Do not skip doses to prevent withdrawals.  Continue ways to reduce stress levels.  Prioritize tasks and avoid taking on multiple ones at once.  Consider behavior therapy if you are still struggling with some anxiety symptoms.    Schedule wellness in-clinic visit in the next 4-6 weeks as you are overdue for it and for medication physical check up.

## 2023-10-09 NOTE — COMMUNITY RESOURCES LIST (ENGLISH)
10/09/2023   [facility]  N/A  For questions about this resource list or additional care needs, please contact your primary care clinic or care manager.  Phone: [phone]   Email: N/A   Address: [address] [place] [address]   Hours: N/A        Food and Nutrition       Food pantry  1  [employer] - Food Shelf Distance: 0.35 miles      In-Person   [address] [place] [address]  Language: English  Hours: Mon - Sun Appt. Only  Fees: Free   Phone: (113) 523-6510 Email: nikhilbrauliosunday@[employer]) Website: http://www.communityBoone Memorial Hospitalhand.org     2  [employer] Food Shelf - [place] Distance: 0.85 miles      Pickup   [address] [place] [address]  Language: English  Hours: Mon - Tue 9:00 AM - 5:00 PM , Thu 9:00 AM - 5:00 PM , Sat 9:00 AM - 12:00 PM  Fees: Free   Phone: (906) 521-3067 Ext.112 Email: kimberly@familypathways.org Website: http://www.familypathOcutec.org     SNAP application assistance  3  [facility] Distance: 2.24 miles      In-Person, Phone/Virtual   [address] [place] [address]  Language: English  Hours: Mon - Fri 8:00 AM - 4:30 PM  Fees: Free   Phone: (615) 748-9810 Email: chriss@[facility]. Website: https://www.[facility]./Facilities/Facility/Details/23     4  [facility] Services - Economic Support - [place] Distance: 18.74 miles      In-Person, Phone/Virtual   [address], [place] [address]  Language: English  Hours: Mon - Fri 8:00 AM - 4:30 PM  Fees: Free   Phone: (106) 492-9222 Email: chriss@[facility]. Website: https://www.[facility]./787/Economic-Support     Soup kitchen or free meals  5  [facility] - Homeless Outreach Services Team - Food Assistance Distance: 14.78 miles      Pickup   [address] [place] [address]  Language: English  Hours: Mon - Thu 9:30 AM - 3:30 PM   Fees: Free   Phone: (410) 228-9552 Email: office@[employer].org Website: https://www.[facility].org/community-resource-center/     6  [facility] - Family longterm - Thursday Night Community Meal Distance: 14.88 miles      In-Person   [address] [place] [address]  Language: English, Swedish  Hours: Thu 6:00 PM - 7:00 PM  Fees: Free   Phone: (636) 941-1819 Email: center@[facility].org Website: https://www.[facility].org          Transportation       Free or low-cost transportation  7  [employer] - [place] [address] Distance: 15.08 miles      In-Person   [address] [facility] [address] [place] [address]  Language: English  Hours: Mon - Fri 9:00 AM - 5:00 PM  Fees: Free   Phone: (918) 365-3917 Email: info@[employer] Website: https://www.[employer]/     8  [employer] Distance: 23.21 miles      In-Person   [address] [place] [address]  Language: English, Swedish  Hours: Mon 9:00 AM - 5:00 PM , Tue 9:30 AM - 7:00 PM , Wed 9:00 AM - 5:00 PM , Thu 9:30 AM - 7:00 PM , Fri 9:00 AM - 5:00 PM  Fees: Free   Phone: (795) 149-8639 Email: contactus@[employer] Website: http://www.[employer]     Transportation to medical appointments  9  [employer] Distance: 18.6 miles      In-Person   [address] [address]  Language: English  Hours: Mon - Thu 6:00 AM - 6:00 PM , Fri 6:00 AM - 5:00 PM  Fees: Insurance, Self Pay   Phone: (242) 328-8011 Email: office@[employer] Website: https://www.[employer]/     10  [place] -   Family Wellness ([employer]) Distance: 19.97 miles      In-Person   [address] [facility] [address]  Language: Kiswahili, Persian, English, Gujarati, Selene, Estonian, Romanian, Slovenian, Vietnamese, Icelandic  Hours: Mon - Wed 9:00 AM - 5:00 PM , Thu 12:00 PM - 6:00 PM , Fri 9:00 AM - 5:00 PM , Sun 10:30 AM - 2:00 PM Appt. Only  Fees: Free   Phone: (668) 690-4110 Email: info@[employer].org Website: https://www.sewa-aifw.org/          Important  Numbers & Websites       Emergency Services   911  [facility]   311  Poison Control   (265) 242-2014  Suicide Prevention Lifeline   (588) 870-7643 (TALK)  Child Abuse Hotline   (678) 484-6054 (4-A-Child)  Sexual Assault Hotline   (483) 119-9230 (HOPE)  National Runaway Safeline   (998) 228-8540 (RUNAWAY)  All-Options Talkline   (101) 244-6213  Substance Abuse Referral   (222) 753-9842 (HELP)

## 2023-10-09 NOTE — PROGRESS NOTES
Brenda is a 23 year old who is being evaluated via a billable video visit.      How would you like to obtain your AVS? MyChart  If the video visit is dropped, the invitation should be resent by: Text to cell phone: [phone]  Will anyone else be joining your video visit? No          Assessment & Plan     Anxiety and depression  Stable now per patient.  Denies need to increase med dose.  Reinforced non-medical means to manage and cope with stress.  Keep active; exercise regularly.  Return precautions discussed and given to patient.   - escitalopram (LEXAPRO) 10 MG tablet  Dispense: 90 tablet; Refill: 0     Depression Screening Follow Up        10/9/2023     4:26 PM   PHQ   PHQ-9 Total Score 11   Q9: Thoughts of better off dead/self-harm past 2 weeks Not at all         10/9/2023     4:26 PM   Last PHQ-9   1.  Little interest or pleasure in doing things 1   2.  Feeling down, depressed, or hopeless 2   3.  Trouble falling or staying asleep, or sleeping too much 1   4.  Feeling tired or having little energy 2   5.  Poor appetite or overeating 2   6.  Feeling bad about yourself 3   7.  Trouble concentrating 0   8.  Moving slowly or restless 0   Q9: Thoughts of better off dead/self-harm past 2 weeks 0   PHQ-9 Total Score 11         Patient Instructions   You report to be doing much better.  Keep taking escitalopram 10 mg daily. Do not skip doses to prevent withdrawals.  Continue ways to reduce stress levels.  Prioritize tasks and avoid taking on multiple ones at once.  Consider behavior therapy if you are still struggling with some anxiety symptoms.    Schedule wellness in-clinic visit in the next 4-6 weeks as you are overdue for it and for medication physical check up.      Dallas Leroy MD  [facility]    Pepito Gutierrez is a 23 year old, presenting for the following health issues:  Anxiety (Med recheck) and Depression        10/9/2023     4:34 PM   Additional Questions   Roomed by Francisca  KEITH Hidalgo   Accompanied by self         10/9/2023     4:34 PM   Patient Reported Additional Medications   Patient reports taking the following new medications none       History of Present Illness       Mental Health Follow-up:  Patient presents to follow-up on Depression & Anxiety.Patient's depression since last visit has been:  Good  The patient is not having other symptoms associated with depression.  Patient's anxiety since last visit has been:  Good  The patient is not having other symptoms associated with anxiety.  Any significant life events: financial concerns  Patient is feeling anxious or having panic attacks.  Patient has no concerns about alcohol or drug use.    He eats 0-1 servings of fruits and vegetables daily.He consumes 3 sweetened beverage(s) daily.He exercises with enough effort to increase his heart rate 30 to 60 minutes per day.  He exercises with enough effort to increase his heart rate 5 days per week.   He is not taking prescribed medications regularly due to remembering to take.     Patient said he is not concerned about any depression at this time in spite of his PHQ9 score.  Will try to repeat questionnaire on clinic visit.    Review of Systems   Psychiatric/Behavioral:  The patient is nervous/anxious.       Constitutional, HEENT, cardiovascular, pulmonary, GI, , musculoskeletal, neuro, skin, endocrine and psych systems are negative, except as otherwise noted.      Objective    Vitals - Patient Reported  Pain Score: No Pain (0)        Physical Exam   GENERAL: alert and no distress  EYES: Eyes grossly normal to inspection.  No discharge or erythema, or obvious scleral/conjunctival abnormalities.  RESP: No audible wheeze, cough, or visible cyanosis.  No visible retractions or increased work of breathing.    SKIN: Visible skin clear. No significant rash, abnormal pigmentation or lesions.  NEURO: Cranial nerves grossly intact.  Mentation and speech appropriate for age.  PSYCH: Mentation  appears normal, affect normal/bright, judgement and insight intact, normal speech and appearance well-groomed.    No results found for any visits on 10/09/23.            Video-Visit Details    Type of service:  Video Visit   Video Start Time: 5:06 PM  Video End Time:5:15 PM    Originating Location (pt. Location): Home    Distant Location (provider location):  On-site  Platform used for Video Visit: Jessica

## 2024-01-05 ENCOUNTER — TELEPHONE (OUTPATIENT)
Dept: FAMILY MEDICINE | Facility: CLINIC | Age: 24
End: 2024-01-05
Payer: COMMERCIAL

## 2024-01-05 DIAGNOSIS — F41.9 ANXIETY AND DEPRESSION: ICD-10-CM

## 2024-01-05 DIAGNOSIS — F32.A ANXIETY AND DEPRESSION: ICD-10-CM

## 2024-01-05 RX ORDER — ESCITALOPRAM OXALATE 10 MG/1
10 TABLET ORAL DAILY
Qty: 90 TABLET | Refills: 0 | Status: SHIPPED | OUTPATIENT
Start: 2024-01-05 | End: 2024-02-14

## 2024-01-05 NOTE — TELEPHONE ENCOUNTER
"Requested Prescriptions   Pending Prescriptions Disp Refills    escitalopram (LEXAPRO) 10 MG tablet [Pharmacy Med Name: ESCITALOPRAM 10MG TABLETS] 90 tablet 0     Sig: TAKE 1 TABLET(10 MG) BY MOUTH DAILY       SSRIs Protocol Failed - 1/5/2024  3:39 AM        Failed - PHQ-9 score less than 5 in past 6 months     Please review last PHQ-9 score.           Passed - Medication is active on med list        Passed - Patient is age 18 or older        Passed - Recent (6 mo) or future (30 days) visit within the authorizing provider's specialty     Patient had office visit in the last 6 months or has a visit in the next 30 days with authorizing provider or within the authorizing provider's specialty.  See \"Patient Info\" tab in inbasket, or \"Choose Columns\" in Meds & Orders section of the refill encounter.                 "

## 2024-01-12 NOTE — TELEPHONE ENCOUNTER
7/22/2023     1:32 PM 10/9/2023     4:26 PM 1/5/2024     4:06 PM   PHQ   PHQ-9 Total Score 13 11 10   Q9: Thoughts of better off dead/self-harm past 2 weeks Not at all Not at all Not at all           7/22/2023     1:32 PM 10/9/2023     4:36 PM 1/5/2024     4:07 PM   PETROS-7 SCORE   Total Score 14 (moderate anxiety) 8 (mild anxiety) 12 (moderate anxiety)   Total Score 14 8 12

## 2024-01-17 ENCOUNTER — MYC REFILL (OUTPATIENT)
Dept: FAMILY MEDICINE | Facility: CLINIC | Age: 24
End: 2024-01-17
Payer: COMMERCIAL

## 2024-01-17 DIAGNOSIS — F41.9 ANXIETY AND DEPRESSION: ICD-10-CM

## 2024-01-17 DIAGNOSIS — F32.A ANXIETY AND DEPRESSION: ICD-10-CM

## 2024-01-18 ENCOUNTER — MYC MEDICAL ADVICE (OUTPATIENT)
Dept: FAMILY MEDICINE | Facility: CLINIC | Age: 24
End: 2024-01-18
Payer: COMMERCIAL

## 2024-01-18 RX ORDER — ESCITALOPRAM OXALATE 10 MG/1
10 TABLET ORAL DAILY
Qty: 90 TABLET | Refills: 0 | OUTPATIENT
Start: 2024-01-18

## 2024-02-14 ENCOUNTER — OFFICE VISIT (OUTPATIENT)
Dept: FAMILY MEDICINE | Facility: CLINIC | Age: 24
End: 2024-02-14
Payer: COMMERCIAL

## 2024-02-14 VITALS
TEMPERATURE: 97.6 F | OXYGEN SATURATION: 98 % | SYSTOLIC BLOOD PRESSURE: 110 MMHG | WEIGHT: 160.4 LBS | DIASTOLIC BLOOD PRESSURE: 62 MMHG | HEART RATE: 53 BPM | HEIGHT: 74 IN | RESPIRATION RATE: 16 BRPM | BODY MASS INDEX: 20.59 KG/M2

## 2024-02-14 DIAGNOSIS — F32.A ANXIETY AND DEPRESSION: ICD-10-CM

## 2024-02-14 DIAGNOSIS — F41.9 ANXIETY AND DEPRESSION: ICD-10-CM

## 2024-02-14 PROCEDURE — 99214 OFFICE O/P EST MOD 30 MIN: CPT | Performed by: FAMILY MEDICINE

## 2024-02-14 RX ORDER — ESCITALOPRAM OXALATE 20 MG/1
20 TABLET ORAL DAILY
Qty: 90 TABLET | Refills: 3 | Status: SHIPPED | OUTPATIENT
Start: 2024-02-14

## 2024-02-14 ASSESSMENT — PAIN SCALES - GENERAL: PAINLEVEL: NO PAIN (0)

## 2024-02-14 ASSESSMENT — ANXIETY QUESTIONNAIRES
5. BEING SO RESTLESS THAT IT IS HARD TO SIT STILL: SEVERAL DAYS
IF YOU CHECKED OFF ANY PROBLEMS ON THIS QUESTIONNAIRE, HOW DIFFICULT HAVE THESE PROBLEMS MADE IT FOR YOU TO DO YOUR WORK, TAKE CARE OF THINGS AT HOME, OR GET ALONG WITH OTHER PEOPLE: SOMEWHAT DIFFICULT
8. IF YOU CHECKED OFF ANY PROBLEMS, HOW DIFFICULT HAVE THESE MADE IT FOR YOU TO DO YOUR WORK, TAKE CARE OF THINGS AT HOME, OR GET ALONG WITH OTHER PEOPLE?: SOMEWHAT DIFFICULT
GAD7 TOTAL SCORE: 9
7. FEELING AFRAID AS IF SOMETHING AWFUL MIGHT HAPPEN: MORE THAN HALF THE DAYS
4. TROUBLE RELAXING: SEVERAL DAYS
3. WORRYING TOO MUCH ABOUT DIFFERENT THINGS: SEVERAL DAYS
1. FEELING NERVOUS, ANXIOUS, OR ON EDGE: SEVERAL DAYS
GAD7 TOTAL SCORE: 9
2. NOT BEING ABLE TO STOP OR CONTROL WORRYING: SEVERAL DAYS
6. BECOMING EASILY ANNOYED OR IRRITABLE: MORE THAN HALF THE DAYS
GAD7 TOTAL SCORE: 9
7. FEELING AFRAID AS IF SOMETHING AWFUL MIGHT HAPPEN: MORE THAN HALF THE DAYS

## 2024-02-14 ASSESSMENT — ENCOUNTER SYMPTOMS: NERVOUS/ANXIOUS: 1

## 2024-02-14 ASSESSMENT — PATIENT HEALTH QUESTIONNAIRE - PHQ9
10. IF YOU CHECKED OFF ANY PROBLEMS, HOW DIFFICULT HAVE THESE PROBLEMS MADE IT FOR YOU TO DO YOUR WORK, TAKE CARE OF THINGS AT HOME, OR GET ALONG WITH OTHER PEOPLE: SOMEWHAT DIFFICULT
SUM OF ALL RESPONSES TO PHQ QUESTIONS 1-9: 11
SUM OF ALL RESPONSES TO PHQ QUESTIONS 1-9: 11

## 2024-02-14 NOTE — LETTER
February 14, 2024      Brenda Rizzo  [address] [facility] [address]        To Whom It May Concern:    Brenda Rizzo  was seen on February 14, 2024 in the primary care clinic.  Please excuse him for the missed hours of work.  Thank you for your consideration.        Sincerely,        Dallas Leroy MD

## 2024-02-14 NOTE — PROGRESS NOTES
Assessment & Plan     Anxiety and depression  Improved but patient reports he can still improve.  Discussed adjustment of dose of exscitalopram. He agreed to increase to 20 mg daily.  Advised if still with symptoms after 1-2 months, consider referral to CBT or collaborative psychiatry.  He verbalized understanding.  Return precautions discussed and given to patient.   - escitalopram (LEXAPRO) 20 MG tablet  Dispense: 90 tablet; Refill: 3      Depression Screening Follow Up        2/14/2024     2:43 PM   PHQ   PHQ-9 Total Score 11   Q9: Thoughts of better off dead/self-harm past 2 weeks Not at all         2/14/2024     2:43 PM   Last PHQ-9   1.  Little interest or pleasure in doing things 1   2.  Feeling down, depressed, or hopeless 1   3.  Trouble falling or staying asleep, or sleeping too much 0   4.  Feeling tired or having little energy 1   5.  Poor appetite or overeating 3   6.  Feeling bad about yourself 2   7.  Trouble concentrating 0   8.  Moving slowly or restless 3   Q9: Thoughts of better off dead/self-harm past 2 weeks 0   PHQ-9 Total Score 11       Follow Up Actions Taken  Adjusted patient's anti-depressant medication.       Patient Instructions   You preferred to increase     Subjective   Brenda is a 23 year old, presenting for the following health issues:  Depression and Anxiety        2/14/2024     3:01 PM   Additional Questions   Roomed by Jayne REIS   Accompanied by self     History of Present Illness       Mental Health Follow-up:  Patient presents to follow-up on Anxiety.    Patient's anxiety since last visit has been:  Better  The patient is having other symptoms associated with anxiety.  Any significant life events: No  Patient is feeling anxious or having panic attacks.  Patient has no concerns about alcohol or drug use.    He eats 0-1 servings of fruits and vegetables daily.He consumes 2 sweetened beverage(s) daily.He exercises with enough effort to increase his heart rate 60 or more minutes per  "day.  He exercises with enough effort to increase his heart rate 5 days per week. He is missing 2 dose(s) of medications per week.         Depression Followup  How are you doing with your depression since your last visit? Improved but still has some milder anxiety habits and feelings he would like to improve  Are you having other symptoms that might be associated with depression? No  Have you had a significant life event?  No   Are you feeling anxious or having panic attacks?   Yes:  still feels nervous sometimes when at work or with other people  Do you have any concerns with your use of alcohol or other drugs? No    Social History     Tobacco Use    Smoking status: Every Day     Types: Vaping Device    Smokeless tobacco: Never   Vaping Use    Vaping Use: Every day    Substances: Nicotine    Devices: [employer] [place] tank   Substance Use Topics    Alcohol use: Yes     Comment: social    Drug use: Not Currently     Types: Marijuana     Comment: daily         10/9/2023     4:26 PM 1/5/2024     4:06 PM 2/14/2024     2:43 PM   PHQ   PHQ-9 Total Score 11 10 11   Q9: Thoughts of better off dead/self-harm past 2 weeks Not at all Not at all Not at all         10/9/2023     4:36 PM 1/5/2024     4:07 PM 2/14/2024     2:46 PM   PETROS-7 SCORE   Total Score 8 (mild anxiety) 12 (moderate anxiety) 9 (mild anxiety)   Total Score 8 12 9         Suicide Assessment Five-step Evaluation and Treatment (SAFE-T)          Review of Systems  Constitutional, HEENT, cardiovascular, pulmonary, GI, , musculoskeletal, neuro, skin, endocrine and psych systems are negative, except as otherwise noted.      Objective    /62 (BP Location: Right arm, Patient Position: Chair, Cuff Size: Adult Regular)   Pulse 53   Temp 97.6  F (36.4  C) (Tympanic)   Resp 16   Ht 1.867 m (6' 1.5\")   Wt 72.8 kg (160 lb 6.4 oz)   SpO2 98%   BMI 20.88 kg/m    Body mass index is 20.88 kg/m .  Physical Exam   GENERAL: healthy, alert and no distress  EYES: no icterus, " PERRLA  SKIN: no jaundice/rash  NEURO: no tremors  PSYCH: well-kempt, linear thought process, normal speech, good insight/judgement, normal mood, appropriate affect, no suicidality, no aggression, no hallucination     No results found for any visits on 02/14/24.        Signed Electronically by: Dallas Leroy MD

## 2024-05-29 ENCOUNTER — TELEPHONE (OUTPATIENT)
Dept: FAMILY MEDICINE | Facility: CLINIC | Age: 24
End: 2024-05-29

## 2024-05-29 ENCOUNTER — OFFICE VISIT (OUTPATIENT)
Dept: FAMILY MEDICINE | Facility: CLINIC | Age: 24
End: 2024-05-29
Payer: COMMERCIAL

## 2024-05-29 VITALS
DIASTOLIC BLOOD PRESSURE: 76 MMHG | TEMPERATURE: 97.6 F | OXYGEN SATURATION: 98 % | SYSTOLIC BLOOD PRESSURE: 120 MMHG | HEART RATE: 60 BPM | BODY MASS INDEX: 19.38 KG/M2 | RESPIRATION RATE: 16 BRPM | HEIGHT: 74 IN | WEIGHT: 151 LBS

## 2024-05-29 DIAGNOSIS — J06.9 UPPER RESPIRATORY TRACT INFECTION, UNSPECIFIED TYPE: ICD-10-CM

## 2024-05-29 DIAGNOSIS — R07.0 THROAT PAIN: Primary | ICD-10-CM

## 2024-05-29 LAB
DEPRECATED S PYO AG THROAT QL EIA: NEGATIVE
FLUAV AG SPEC QL IA: NEGATIVE
FLUBV AG SPEC QL IA: NEGATIVE
GROUP A STREP BY PCR: NOT DETECTED

## 2024-05-29 PROCEDURE — 87651 STREP A DNA AMP PROBE: CPT | Performed by: NURSE PRACTITIONER

## 2024-05-29 PROCEDURE — 87804 INFLUENZA ASSAY W/OPTIC: CPT | Performed by: NURSE PRACTITIONER

## 2024-05-29 PROCEDURE — 99213 OFFICE O/P EST LOW 20 MIN: CPT | Performed by: NURSE PRACTITIONER

## 2024-05-29 ASSESSMENT — PAIN SCALES - GENERAL: PAINLEVEL: MILD PAIN (2)

## 2024-05-29 ASSESSMENT — PATIENT HEALTH QUESTIONNAIRE - PHQ9
SUM OF ALL RESPONSES TO PHQ QUESTIONS 1-9: 5
SUM OF ALL RESPONSES TO PHQ QUESTIONS 1-9: 5
10. IF YOU CHECKED OFF ANY PROBLEMS, HOW DIFFICULT HAVE THESE PROBLEMS MADE IT FOR YOU TO DO YOUR WORK, TAKE CARE OF THINGS AT HOME, OR GET ALONG WITH OTHER PEOPLE: NOT DIFFICULT AT ALL

## 2024-05-29 NOTE — PATIENT INSTRUCTIONS
Pseudoephedrine (Sudafed) 120 mg every 12 hours as needed for nasal congestion.    Guaifenesin (Muscinex) 600 mg every 12 hours for cough or congestion.

## 2024-05-29 NOTE — PROGRESS NOTES
"  Assessment & Plan     Throat pain  - Streptococcus A Rapid Screen w/Reflex to PCR - Clinic Collect  - Group A Streptococcus PCR Throat Swab  - Influenza A/B antigen    Upper respiratory tract infection, unspecified type  Influenza, strep culture and rapid strep were all negative.  Likely self-limiting and will improve with time.  Can use Tylenol/ibuprofen for pain or fever.  Can use Sudafed or guaifenesin for coughing/nasal congestion.        FUTURE APPOINTMENTS:       - Follow-up for annual visit or as needed    Subjective   Brenda is a 23 year old, presenting for the following health issues:  Sinus Problem    History of Present Illness       Reason for visit:  Throat/nose/ear pain  Symptom onset:  3-7 days ago  Symptoms include:  Hurts to swollow/ lymph nodes and ears hurt  Symptom intensity:  Moderate  Symptom progression:  Staying the same  Had these symptoms before:  No  What makes it worse:  Hurts to eat/drink  What makes it better:  Gargling salt water    He eats 2-3 servings of fruits and vegetables daily.He consumes 3 sweetened beverage(s) daily.He exercises with enough effort to increase his heart rate 60 or more minutes per day.  He exercises with enough effort to increase his heart rate 4 days per week. He is missing 1 dose(s) of medications per week.   Neon green phelghm.  No known exposure to strep.      For the past 7 days; has had Fatigue, joint pain, muscle aches, sore throat, harder to breathe,  no known exposure to strep      Review of Systems  Constitutional, HEENT, cardiovascular, pulmonary, gi and gu systems are negative, except as otherwise noted.      Objective    /76 (BP Location: Right arm, Patient Position: Sitting, Cuff Size: Adult Regular)   Pulse 60   Temp 97.6  F (36.4  C) (Tympanic)   Resp 16   Ht 1.867 m (6' 1.5\")   Wt 68.5 kg (151 lb)   SpO2 98%   BMI 19.65 kg/m    Body mass index is 19.65 kg/m .  Physical Exam  Constitutional:       Appearance: Normal appearance. "   HENT:      Head: Normocephalic.      Right Ear: Tympanic membrane, ear canal and external ear normal.      Left Ear: Tympanic membrane, ear canal and external ear normal.      Nose: Nose normal.      Mouth/Throat:      Mouth: Mucous membranes are moist.      Pharynx: Oropharynx is clear.   Cardiovascular:      Rate and Rhythm: Normal rate and regular rhythm.      Heart sounds: Normal heart sounds.   Pulmonary:      Effort: Pulmonary effort is normal.      Breath sounds: Normal breath sounds.   Musculoskeletal:      Cervical back: Normal range of motion.   Lymphadenopathy:      Cervical: No cervical adenopathy.   Skin:     General: Skin is warm and dry.   Neurological:      Mental Status: He is alert and oriented to person, place, and time.   Psychiatric:         Mood and Affect: Mood normal.         Behavior: Behavior normal.         Thought Content: Thought content normal.         Judgment: Judgment normal.                    Signed Electronically by: RAISA Moss CNP

## 2024-06-22 ENCOUNTER — HEALTH MAINTENANCE LETTER (OUTPATIENT)
Age: 24
End: 2024-06-22

## 2025-04-07 ENCOUNTER — ANCILLARY PROCEDURE (OUTPATIENT)
Dept: GENERAL RADIOLOGY | Facility: CLINIC | Age: 25
End: 2025-04-07
Attending: FAMILY MEDICINE

## 2025-04-07 ENCOUNTER — OFFICE VISIT (OUTPATIENT)
Dept: FAMILY MEDICINE | Facility: CLINIC | Age: 25
End: 2025-04-07

## 2025-04-07 VITALS
BODY MASS INDEX: 23.1 KG/M2 | OXYGEN SATURATION: 96 % | HEIGHT: 74 IN | RESPIRATION RATE: 14 BRPM | HEART RATE: 70 BPM | DIASTOLIC BLOOD PRESSURE: 70 MMHG | SYSTOLIC BLOOD PRESSURE: 108 MMHG | WEIGHT: 180 LBS | TEMPERATURE: 99.2 F

## 2025-04-07 DIAGNOSIS — M19.172 POST-TRAUMATIC OSTEOARTHRITIS OF LEFT ANKLE: Primary | ICD-10-CM

## 2025-04-07 DIAGNOSIS — M19.172 POST-TRAUMATIC OSTEOARTHRITIS OF LEFT ANKLE: ICD-10-CM

## 2025-04-07 PROCEDURE — 73610 X-RAY EXAM OF ANKLE: CPT | Mod: TC | Performed by: RADIOLOGY

## 2025-04-07 PROCEDURE — 99213 OFFICE O/P EST LOW 20 MIN: CPT | Performed by: FAMILY MEDICINE

## 2025-04-07 ASSESSMENT — PAIN SCALES - GENERAL: PAINLEVEL_OUTOF10: MODERATE PAIN (5)

## 2025-04-07 NOTE — PROGRESS NOTES
"  Assessment & Plan     Post-traumatic osteoarthritis of left ankle  No fracture on my read.   If he can't walk on level ground in 1 week without pain nor run in 3 weeks without pain he shoud be seen again.  - XR Ankle Left G/E 3 Views; Future      Nicotine/Tobacco Cessation  He reports that he has been smoking vaping device. He has never used smokeless tobacco.      Subjective   Brenda is a 24 year old, presenting for the following health issues:  Ankle Pain        4/7/2025     3:24 PM   Additional Questions   Roomed by Cecille Chinchilla CMA   Accompanied by Girlfriend     Ankle Pain      Concern - Left Ankle Injury-Possibly broken  Onset: Two days ago-Saturday night  Description: Was walking backwards off of a truck bed and tripped and fell backwards.  Intensity: mild  Progression of Symptoms:  same  Accompanying Signs & Symptoms: Bruising and the bottom of his foot feel tingly. Is having a hard time putting pressure on his foot. He can walk on it, but is very difficult.   Previous history of similar problem: none   Precipitating factors:        Worsened by: walking on it   Alleviating factors:        Improved by: applying pressure and wearing brace. Ice, and ibuprofen  Therapies tried and outcome: brace, ice and ibuprofen.    Review of Systems  Constitutional, HEENT, cardiovascular, pulmonary, gi and gu systems are negative, except as otherwise noted.      Objective    /70 (BP Location: Right arm, Patient Position: Sitting, Cuff Size: Adult Regular)   Pulse 70   Temp 99.2  F (37.3  C) (Tympanic)   Resp 14   Ht 1.867 m (6' 1.5\")   Wt 81.6 kg (180 lb)   SpO2 96%   BMI 23.43 kg/m    Body mass index is 23.43 kg/m .  Physical Exam   GENERAL: alert and no distress  NECK: no adenopathy, no asymmetry, masses, or scars  RESP: lungs clear to auscultation - no rales, rhonchi or wheezes  CV: regular rate and rhythm, normal S1 S2, no S3 or S4, no murmur, click or rub, no peripheral edema  ABDOMEN: soft, nontender, no " hepatosplenomegaly, no masses and bowel sounds normal  MS: no gross musculoskeletal defects noted, no edema  There is swelling and tenderness over the lateral malleolus. in region of anterior talofibular ligament.  No tenderness over the medial aspect of the ankle. The fifth metatarsal is not tender. The ankle joint is intact without excessive opening on stressing. X-Ray shows fracture to be absent by my personal review and read of x-ray. The rest of the foot, ankle and leg exam is normal.         Signed Electronically by: Linden Burroughs MD

## 2025-07-12 ENCOUNTER — HEALTH MAINTENANCE LETTER (OUTPATIENT)
Age: 25
End: 2025-07-12

## 2025-08-08 DIAGNOSIS — F41.9 ANXIETY AND DEPRESSION: ICD-10-CM

## 2025-08-08 DIAGNOSIS — F32.A ANXIETY AND DEPRESSION: ICD-10-CM

## 2025-08-11 RX ORDER — ESCITALOPRAM OXALATE 20 MG/1
20 TABLET ORAL DAILY
Qty: 30 TABLET | Refills: 0 | Status: SHIPPED | OUTPATIENT
Start: 2025-08-11

## 2025-08-12 DIAGNOSIS — F32.A ANXIETY AND DEPRESSION: ICD-10-CM

## 2025-08-12 DIAGNOSIS — F41.9 ANXIETY AND DEPRESSION: ICD-10-CM

## 2025-08-13 RX ORDER — ESCITALOPRAM OXALATE 20 MG/1
20 TABLET ORAL DAILY
Qty: 30 TABLET | Refills: 0 | OUTPATIENT
Start: 2025-08-13

## 2025-08-24 ENCOUNTER — MYC REFILL (OUTPATIENT)
Dept: FAMILY MEDICINE | Facility: CLINIC | Age: 25
End: 2025-08-24

## 2025-08-24 DIAGNOSIS — F41.9 ANXIETY AND DEPRESSION: ICD-10-CM

## 2025-08-24 DIAGNOSIS — F32.A ANXIETY AND DEPRESSION: ICD-10-CM

## 2025-08-25 RX ORDER — ESCITALOPRAM OXALATE 20 MG/1
20 TABLET ORAL DAILY
Qty: 30 TABLET | Refills: 0 | OUTPATIENT
Start: 2025-08-25